# Patient Record
Sex: MALE | Race: ASIAN | NOT HISPANIC OR LATINO | Employment: FULL TIME | URBAN - METROPOLITAN AREA
[De-identification: names, ages, dates, MRNs, and addresses within clinical notes are randomized per-mention and may not be internally consistent; named-entity substitution may affect disease eponyms.]

---

## 2018-03-24 ENCOUNTER — OFFICE VISIT (OUTPATIENT)
Dept: URGENT CARE | Facility: CLINIC | Age: 48
End: 2018-03-24
Payer: COMMERCIAL

## 2018-03-24 VITALS
OXYGEN SATURATION: 98 % | RESPIRATION RATE: 16 BRPM | SYSTOLIC BLOOD PRESSURE: 115 MMHG | HEART RATE: 74 BPM | TEMPERATURE: 97.4 F | DIASTOLIC BLOOD PRESSURE: 66 MMHG

## 2018-03-24 DIAGNOSIS — J20.9 ACUTE BRONCHITIS, UNSPECIFIED ORGANISM: Primary | ICD-10-CM

## 2018-03-24 PROCEDURE — S9088 SERVICES PROVIDED IN URGENT: HCPCS | Performed by: PHYSICIAN ASSISTANT

## 2018-03-24 PROCEDURE — 99203 OFFICE O/P NEW LOW 30 MIN: CPT | Performed by: PHYSICIAN ASSISTANT

## 2018-03-24 RX ORDER — AZITHROMYCIN 250 MG/1
TABLET, FILM COATED ORAL
Qty: 6 TABLET | Refills: 0 | Status: SHIPPED | OUTPATIENT
Start: 2018-03-24 | End: 2018-03-28

## 2018-03-24 RX ORDER — BENZONATATE 100 MG/1
100 CAPSULE ORAL 3 TIMES DAILY PRN
Qty: 20 CAPSULE | Refills: 0 | Status: SHIPPED | OUTPATIENT
Start: 2018-03-24 | End: 2019-12-23

## 2018-03-24 NOTE — PATIENT INSTRUCTIONS

## 2019-08-19 ENCOUNTER — OFFICE VISIT (OUTPATIENT)
Dept: FAMILY MEDICINE CLINIC | Facility: CLINIC | Age: 49
End: 2019-08-19
Payer: COMMERCIAL

## 2019-08-19 ENCOUNTER — APPOINTMENT (OUTPATIENT)
Dept: LAB | Facility: CLINIC | Age: 49
End: 2019-08-19
Payer: COMMERCIAL

## 2019-08-19 VITALS
DIASTOLIC BLOOD PRESSURE: 74 MMHG | WEIGHT: 162.3 LBS | SYSTOLIC BLOOD PRESSURE: 120 MMHG | BODY MASS INDEX: 26.08 KG/M2 | OXYGEN SATURATION: 99 % | HEIGHT: 66 IN | HEART RATE: 69 BPM

## 2019-08-19 DIAGNOSIS — E11.9 TYPE 2 DIABETES MELLITUS WITHOUT COMPLICATION, WITHOUT LONG-TERM CURRENT USE OF INSULIN (HCC): Primary | ICD-10-CM

## 2019-08-19 DIAGNOSIS — E11.9 TYPE 2 DIABETES MELLITUS WITHOUT COMPLICATION, WITHOUT LONG-TERM CURRENT USE OF INSULIN (HCC): ICD-10-CM

## 2019-08-19 DIAGNOSIS — E78.00 PURE HYPERCHOLESTEROLEMIA: ICD-10-CM

## 2019-08-19 LAB
ANION GAP SERPL CALCULATED.3IONS-SCNC: 6 MMOL/L (ref 4–13)
BUN SERPL-MCNC: 18 MG/DL (ref 5–25)
CALCIUM SERPL-MCNC: 9.1 MG/DL (ref 8.3–10.1)
CHLORIDE SERPL-SCNC: 108 MMOL/L (ref 100–108)
CO2 SERPL-SCNC: 28 MMOL/L (ref 21–32)
CREAT SERPL-MCNC: 0.82 MG/DL (ref 0.6–1.3)
EST. AVERAGE GLUCOSE BLD GHB EST-MCNC: 143 MG/DL
GFR SERPL CREATININE-BSD FRML MDRD: 105 ML/MIN/1.73SQ M
GLUCOSE P FAST SERPL-MCNC: 133 MG/DL (ref 65–99)
HBA1C MFR BLD: 6.6 % (ref 4.2–6.3)
POTASSIUM SERPL-SCNC: 4.1 MMOL/L (ref 3.5–5.3)
SODIUM SERPL-SCNC: 142 MMOL/L (ref 136–145)

## 2019-08-19 PROCEDURE — 80048 BASIC METABOLIC PNL TOTAL CA: CPT

## 2019-08-19 PROCEDURE — 83036 HEMOGLOBIN GLYCOSYLATED A1C: CPT

## 2019-08-19 PROCEDURE — 99213 OFFICE O/P EST LOW 20 MIN: CPT | Performed by: FAMILY MEDICINE

## 2019-08-19 PROCEDURE — 3008F BODY MASS INDEX DOCD: CPT | Performed by: FAMILY MEDICINE

## 2019-08-19 PROCEDURE — 36415 COLL VENOUS BLD VENIPUNCTURE: CPT

## 2019-08-19 RX ORDER — ATORVASTATIN CALCIUM 10 MG/1
10 TABLET, FILM COATED ORAL DAILY
COMMUNITY
End: 2020-05-26 | Stop reason: SDUPTHER

## 2019-08-19 RX ORDER — OLOPATADINE HYDROCHLORIDE 1 MG/ML
SOLUTION/ DROPS OPHTHALMIC
Refills: 2 | COMMUNITY
Start: 2019-05-12 | End: 2021-04-13 | Stop reason: SDUPTHER

## 2019-08-19 NOTE — PROGRESS NOTES
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  Therefore, I do not think it is going to be feasible for him to exercise  However, I do want him to watch his diet  We discussed diabetic foot care at length  He will have fasting blood work today  I will make further recommendations to him at that time  He will return to the office in 4 months  Diagnoses and all orders for this visit:    Type 2 diabetes mellitus without complication, without long-term current use of insulin (HCC)  -     Basic metabolic panel; Future  -     Hemoglobin A1C; Future    Pure hypercholesterolemia    Other orders  -     olopatadine (PATANOL) 0 1 % ophthalmic solution; TAKE 1 DROPS OPHTHALMICALLY TO EACH EYE TWICE A DAY AS NEEDED FOR ITHCHING  -     atorvastatin (LIPITOR) 10 mg tablet; Take 10 mg by mouth daily  -     metFORMIN (GLUCOPHAGE) 1000 MG tablet; Take 1,000 mg by mouth 2 (two) times a day with meals          Subjective:      Patient ID: Kenny Magana is a 51 y o  male  This 41-year-old [facility] male presents to the office today with his wife for his routine checkup  Patient has a history of diabetes  He is scheduled for a dilated eye exam on Wednesday  He does check his feet every day  He does not check his own blood sugars  Wife reports he has not done well on his diet since his last visit  However, his weight has stayed the same        The following portions of the patient's history were reviewed and updated as appropriate: allergies, current medications, past family history, past medical history, past social history, past surgical history and problem list     Review of Systems   Cardiovascular: Negative  Endocrine: Negative for polydipsia, polyphagia and polyuria  Objective:      /74   Pulse 69   Ht 5' 6" (1 676 m)   Wt 73 6 kg (162 lb 4 8 oz)   SpO2 99%   BMI 26 20 kg/m²          Physical Exam   Constitutional: He appears well-developed and well-nourished  No distress  HENT:   Head: Normocephalic and atraumatic  Right Ear: External ear normal    Left Ear: External ear normal    Mouth/Throat: Oropharynx is clear and moist  No oropharyngeal exudate  Eyes: Pupils are equal, round, and reactive to light  Conjunctivae are normal  No scleral icterus  Neck: Neck supple  No tracheal deviation present  No thyromegaly present  Cardiovascular: Normal rate, regular rhythm and normal heart sounds  Exam reveals no gallop and no friction rub  Pulses are no weak pulses  No murmur heard  Pulses:       Dorsalis pedis pulses are 2+ on the right side, and 2+ on the left side  Posterior tibial pulses are 2+ on the right side, and 2+ on the left side  Pulmonary/Chest: Effort normal and breath sounds normal  No stridor  No respiratory distress  He has no wheezes  He has no rales  Abdominal: Soft  Bowel sounds are normal  He exhibits no distension and no mass  There is no tenderness  There is no rebound and no guarding  Feet:   Right Foot:   Skin Integrity: Negative for ulcer, skin breakdown, erythema, warmth, callus or dry skin  Left Foot:   Skin Integrity: Negative for ulcer, skin breakdown, erythema, warmth, callus or dry skin  Lymphadenopathy:     He has no cervical adenopathy  Skin: He is not diaphoretic  Vitals reviewed  Extremities:  Without cyanosis, clubbing, or edema  Patient's shoes and socks removed  Right Foot/Ankle   Right Foot Inspection  Skin Exam: skin normal and skin intact no dry skin, no warmth, no callus, no erythema, no maceration, no abnormal color, no pre-ulcer, no ulcer and no callus                          Toe Exam: no swelling, no tenderness, erythema and  no right toe deformity  Sensory   Vibration: intact  Proprioception: intact   Monofilament testing: intact  Vascular  Capillary refills: < 3 seconds  The right DP pulse is 2+  The right PT pulse is 2+  Left Foot/Ankle  Left Foot Inspection  Skin Exam: skin normal and skin intactno dry skin, no warmth, no erythema, no maceration, normal color, no pre-ulcer, no ulcer and no callus                         Toe Exam: no swelling, no tenderness, no erythema and no left toe deformity                   Sensory   Vibration: intact  Proprioception: intact  Monofilament: intact  Vascular  Capillary refills: < 3 seconds  The left DP pulse is 2+  The left PT pulse is 2+  Assign Risk Category:  No deformity present; No loss of protective sensation;  No weak pulses       Risk: 0

## 2019-08-19 NOTE — ASSESSMENT & PLAN NOTE
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

## 2019-08-19 NOTE — PATIENT INSTRUCTIONS
Foot Care for People with Diabetes   WHAT YOU NEED TO KNOW:   · Foot care helps protect your feet and prevent foot ulcers or sores  Long-term high blood sugar levels can damage the blood vessels and nerves in your legs and feet  This damage makes it hard to feel pressure, pain, temperature, and touch  You may not be able to feel a cut or sore, or shoes that are too tight  Foot care is needed to prevent serious problems, such as an infection or amputation  · Diabetes may cause your toes to become crooked or curved under  These changes may affect the way you walk and can lead to increased pressure on your foot  The pressure can decrease blood flow to your feet  Lack of blood flow increases your risk for a foot ulcer  Do not ignore small problems, such as dry skin or small wounds  These can become life-threatening over time without proper care  DISCHARGE INSTRUCTIONS:   Contact your healthcare provider if:   · Your feet become numb, weak, or hard to move  · You have pus draining from a sore on your foot  · You have a wound on your foot that gets bigger, deeper, or does not heal      · You see blisters, cuts, scratches, calluses, or sores on your foot  · You have a fever, and your feet become red, warm, and swollen  · Your toenails become thick, curled, or yellow  · You find it hard to check your feet because your vision is poor  · You have questions or concerns about your condition or care  Foot care:   · Check your feet each day  Look at your whole foot, including the bottom, and between and under your toes  Check for wounds, corns, and calluses  Use a mirror to see the bottom of your feet  The skin on your feet may be shiny, tight, or darker than normal  Your feet may also be cold and pale  Feel your feet by running your hands along the tops, bottoms, sides, and between your toes  Redness, swelling, and warmth are signs of blood flow problems that can lead to a foot ulcer   Do not try to remove corns or calluses yourself  · Wash your feet each day with soap and warm water  Do not use hot water, because this can injure your foot  Dry your feet gently with a towel after you wash them  Dry between and under your toes  · Apply lotion or a moisturizer on your dry feet  Ask your healthcare provider what lotions are best to use  Do not put lotion or moisturizer between your toes  · Cut your toenails correctly  File or cut your toenails straight across  Use a soft brush to clean around your toenails  If your toenails are very thick, you may need to have a healthcare provider or specialist cut them  · Protect your feet  Do not walk barefoot or wear your shoes without socks  Check your shoes for rocks or other objects that can hurt your feet  Wear cotton socks to help keep your feet dry  Wear socks without toe seams, or wear them with the seams inside out  Change your socks each day  Do not wear socks that are dirty or damp  · Wear shoes that fit well  Wear shoes that do not rub against any area of your feet  Your shoes should be ½ to ¾ inch (1 to 2 centimeters) longer than your feet  Your shoes should also have extra space around the widest part of your feet  Walking or athletic shoes with laces or straps that adjust are best  Ask your healthcare provider for help to choose shoes that fit you best  Ask him if you need to wear an insert, orthotic, or bandage on your feet  Follow up with your healthcare provider or foot specialist as directed: You will need to have your feet checked at least once a year  You may need a foot exam more often if you have nerve damage, foot deformities, or ulcers  Write down your questions so you remember to ask them during your visits  © 2017 [address] [employer] Information is for End User's use only and may not be sold, redistributed or otherwise used for commercial purposes   All illustrations and images included in CareNotes® are the copyrighted property of [employer]  or Troy Srinivasan  The above information is an  only  It is not intended as medical advice for individual conditions or treatments  Talk to your doctor, nurse or pharmacist before following any medical regimen to see if it is safe and effective for you

## 2019-08-31 LAB
LEFT EYE DIABETIC RETINOPATHY: NORMAL
RIGHT EYE DIABETIC RETINOPATHY: NORMAL

## 2019-09-04 DIAGNOSIS — E11.9 TYPE 2 DIABETES MELLITUS WITHOUT COMPLICATION, WITHOUT LONG-TERM CURRENT USE OF INSULIN (HCC): Primary | ICD-10-CM

## 2019-11-21 ENCOUNTER — IMMUNIZATIONS (OUTPATIENT)
Dept: FAMILY MEDICINE CLINIC | Facility: CLINIC | Age: 49
End: 2019-11-21
Payer: COMMERCIAL

## 2019-11-21 DIAGNOSIS — Z23 IMMUNIZATION DUE: Primary | ICD-10-CM

## 2019-11-21 PROCEDURE — 90686 IIV4 VACC NO PRSV 0.5 ML IM: CPT | Performed by: FAMILY MEDICINE

## 2019-11-21 PROCEDURE — 90471 IMMUNIZATION ADMIN: CPT | Performed by: FAMILY MEDICINE

## 2019-12-23 ENCOUNTER — APPOINTMENT (OUTPATIENT)
Dept: LAB | Facility: CLINIC | Age: 49
End: 2019-12-23
Payer: COMMERCIAL

## 2019-12-23 ENCOUNTER — OFFICE VISIT (OUTPATIENT)
Dept: FAMILY MEDICINE CLINIC | Facility: CLINIC | Age: 49
End: 2019-12-23
Payer: COMMERCIAL

## 2019-12-23 ENCOUNTER — APPOINTMENT (EMERGENCY)
Dept: CT IMAGING | Facility: HOSPITAL | Age: 49
End: 2019-12-23
Payer: COMMERCIAL

## 2019-12-23 ENCOUNTER — OFFICE VISIT (OUTPATIENT)
Dept: URGENT CARE | Facility: CLINIC | Age: 49
End: 2019-12-23
Payer: COMMERCIAL

## 2019-12-23 ENCOUNTER — HOSPITAL ENCOUNTER (EMERGENCY)
Facility: HOSPITAL | Age: 49
Discharge: HOME | End: 2019-12-24
Attending: EMERGENCY MEDICINE | Admitting: EMERGENCY MEDICINE
Payer: COMMERCIAL

## 2019-12-23 VITALS
RESPIRATION RATE: 18 BRPM | WEIGHT: 160 LBS | HEIGHT: 66 IN | OXYGEN SATURATION: 98 % | DIASTOLIC BLOOD PRESSURE: 69 MMHG | HEART RATE: 73 BPM | BODY MASS INDEX: 25.71 KG/M2 | TEMPERATURE: 98.4 F | SYSTOLIC BLOOD PRESSURE: 123 MMHG

## 2019-12-23 VITALS
WEIGHT: 159.7 LBS | DIASTOLIC BLOOD PRESSURE: 74 MMHG | OXYGEN SATURATION: 97 % | HEART RATE: 66 BPM | HEIGHT: 66 IN | SYSTOLIC BLOOD PRESSURE: 122 MMHG | BODY MASS INDEX: 25.67 KG/M2

## 2019-12-23 DIAGNOSIS — R10.31 RIGHT LOWER QUADRANT ABDOMINAL PAIN: Primary | ICD-10-CM

## 2019-12-23 DIAGNOSIS — R10.30 LOWER ABDOMINAL PAIN: Primary | ICD-10-CM

## 2019-12-23 DIAGNOSIS — E11.9 TYPE 2 DIABETES MELLITUS WITHOUT COMPLICATION, WITHOUT LONG-TERM CURRENT USE OF INSULIN (HCC): Primary | ICD-10-CM

## 2019-12-23 DIAGNOSIS — E78.00 PURE HYPERCHOLESTEROLEMIA: ICD-10-CM

## 2019-12-23 DIAGNOSIS — E11.9 TYPE 2 DIABETES MELLITUS WITHOUT COMPLICATION, WITHOUT LONG-TERM CURRENT USE OF INSULIN (HCC): ICD-10-CM

## 2019-12-23 LAB
ALBUMIN SERPL BCP-MCNC: 3.8 G/DL (ref 3.5–5)
ALBUMIN SERPL BCP-MCNC: 4.7 G/DL (ref 3.5–5.7)
ALP SERPL-CCNC: 64 U/L (ref 40–150)
ALP SERPL-CCNC: 76 U/L (ref 46–116)
ALT SERPL W P-5'-P-CCNC: 16 U/L (ref 7–52)
ALT SERPL W P-5'-P-CCNC: 23 U/L (ref 12–78)
ANION GAP SERPL CALCULATED.3IONS-SCNC: 3 MMOL/L (ref 4–13)
ANION GAP SERPL CALCULATED.3IONS-SCNC: 7 MMOL/L (ref 4–13)
AST SERPL W P-5'-P-CCNC: 10 U/L (ref 5–45)
AST SERPL W P-5'-P-CCNC: 15 U/L (ref 13–39)
BASOPHILS # BLD AUTO: 0.1 THOUSANDS/ΜL (ref 0–0.1)
BASOPHILS NFR BLD AUTO: 1 % (ref 0–2)
BILIRUB SERPL-MCNC: 0.9 MG/DL (ref 0.2–1)
BILIRUB SERPL-MCNC: 1.18 MG/DL (ref 0.2–1)
BUN SERPL-MCNC: 19 MG/DL (ref 7–25)
BUN SERPL-MCNC: 21 MG/DL (ref 5–25)
CALCIUM SERPL-MCNC: 9 MG/DL (ref 8.3–10.1)
CALCIUM SERPL-MCNC: 9.9 MG/DL (ref 8.6–10.5)
CHLORIDE SERPL-SCNC: 102 MMOL/L (ref 98–107)
CHLORIDE SERPL-SCNC: 109 MMOL/L (ref 100–108)
CHOLEST SERPL-MCNC: 142 MG/DL (ref 50–200)
CO2 SERPL-SCNC: 29 MMOL/L (ref 21–31)
CO2 SERPL-SCNC: 30 MMOL/L (ref 21–32)
CREAT SERPL-MCNC: 0.77 MG/DL (ref 0.7–1.3)
CREAT SERPL-MCNC: 0.9 MG/DL (ref 0.6–1.3)
CREAT UR-MCNC: 118 MG/DL
EOSINOPHIL # BLD AUTO: 0.1 THOUSAND/ΜL (ref 0–0.61)
EOSINOPHIL NFR BLD AUTO: 1 % (ref 0–5)
ERYTHROCYTE [DISTWIDTH] IN BLOOD BY AUTOMATED COUNT: 12.5 % (ref 11.5–14.5)
EST. AVERAGE GLUCOSE BLD GHB EST-MCNC: 143 MG/DL
GFR SERPL CREATININE-BSD FRML MDRD: 101 ML/MIN/1.73SQ M
GFR SERPL CREATININE-BSD FRML MDRD: 107 ML/MIN/1.73SQ M
GLUCOSE P FAST SERPL-MCNC: 130 MG/DL (ref 65–99)
GLUCOSE SERPL-MCNC: 197 MG/DL (ref 65–99)
HBA1C MFR BLD: 6.6 % (ref 4.2–6.3)
HCT VFR BLD AUTO: 46.5 % (ref 42–47)
HDLC SERPL-MCNC: 46 MG/DL
HGB BLD-MCNC: 15.5 G/DL (ref 14–18)
LDLC SERPL CALC-MCNC: 82 MG/DL (ref 0–100)
LIPASE SERPL-CCNC: 59 U/L (ref 11–82)
LYMPHOCYTES # BLD AUTO: 1.3 THOUSANDS/ΜL (ref 0.6–4.47)
LYMPHOCYTES NFR BLD AUTO: 19 % (ref 21–51)
MCH RBC QN AUTO: 30.6 PG (ref 26–34)
MCHC RBC AUTO-ENTMCNC: 33.3 G/DL (ref 31–37)
MCV RBC AUTO: 92 FL (ref 81–99)
MICROALBUMIN UR-MCNC: 16 MG/L (ref 0–20)
MICROALBUMIN/CREAT 24H UR: 14 MG/G CREATININE (ref 0–30)
MONOCYTES # BLD AUTO: 0.5 THOUSAND/ΜL (ref 0.17–1.22)
MONOCYTES NFR BLD AUTO: 8 % (ref 2–12)
NEUTROPHILS # BLD AUTO: 4.8 THOUSANDS/ΜL (ref 1.4–6.5)
NEUTS SEG NFR BLD AUTO: 71 % (ref 42–75)
NONHDLC SERPL-MCNC: 96 MG/DL
PLATELET # BLD AUTO: 148 THOUSANDS/UL (ref 149–390)
PMV BLD AUTO: 9.9 FL (ref 8.6–11.7)
POTASSIUM SERPL-SCNC: 3.8 MMOL/L (ref 3.5–5.5)
POTASSIUM SERPL-SCNC: 4.1 MMOL/L (ref 3.5–5.3)
PROT SERPL-MCNC: 7.3 G/DL (ref 6.4–8.2)
PROT SERPL-MCNC: 7.9 G/DL (ref 6.4–8.9)
RBC # BLD AUTO: 5.06 MILLION/UL (ref 4.3–5.9)
SL AMB  POCT GLUCOSE, UA: NEGATIVE
SL AMB LEUKOCYTE ESTERASE,UA: NEGATIVE
SL AMB POCT BILIRUBIN,UA: NEGATIVE
SL AMB POCT BLOOD,UA: NEGATIVE
SL AMB POCT CLARITY,UA: CLEAR
SL AMB POCT COLOR,UA: YELLOW
SL AMB POCT KETONES,UA: NEGATIVE
SL AMB POCT NITRITE,UA: NEGATIVE
SL AMB POCT PH,UA: 6.5
SL AMB POCT SPECIFIC GRAVITY,UA: 1.01
SL AMB POCT URINE PROTEIN: NEGATIVE
SL AMB POCT UROBILINOGEN: 0.2
SODIUM SERPL-SCNC: 138 MMOL/L (ref 134–143)
SODIUM SERPL-SCNC: 142 MMOL/L (ref 136–145)
TRIGL SERPL-MCNC: 69 MG/DL
WBC # BLD AUTO: 6.7 THOUSAND/UL (ref 4.8–10.8)

## 2019-12-23 PROCEDURE — 80053 COMPREHEN METABOLIC PANEL: CPT

## 2019-12-23 PROCEDURE — 99284 EMERGENCY DEPT VISIT MOD MDM: CPT | Performed by: EMERGENCY MEDICINE

## 2019-12-23 PROCEDURE — 80053 COMPREHEN METABOLIC PANEL: CPT | Performed by: EMERGENCY MEDICINE

## 2019-12-23 PROCEDURE — 99284 EMERGENCY DEPT VISIT MOD MDM: CPT

## 2019-12-23 PROCEDURE — 99213 OFFICE O/P EST LOW 20 MIN: CPT | Performed by: NURSE PRACTITIONER

## 2019-12-23 PROCEDURE — S9088 SERVICES PROVIDED IN URGENT: HCPCS | Performed by: NURSE PRACTITIONER

## 2019-12-23 PROCEDURE — 3061F NEG MICROALBUMINURIA REV: CPT | Performed by: FAMILY MEDICINE

## 2019-12-23 PROCEDURE — 96374 THER/PROPH/DIAG INJ IV PUSH: CPT

## 2019-12-23 PROCEDURE — 83036 HEMOGLOBIN GLYCOSYLATED A1C: CPT

## 2019-12-23 PROCEDURE — 80061 LIPID PANEL: CPT

## 2019-12-23 PROCEDURE — 85025 COMPLETE CBC W/AUTO DIFF WBC: CPT | Performed by: EMERGENCY MEDICINE

## 2019-12-23 PROCEDURE — 74177 CT ABD & PELVIS W/CONTRAST: CPT

## 2019-12-23 PROCEDURE — 96361 HYDRATE IV INFUSION ADD-ON: CPT

## 2019-12-23 PROCEDURE — 82043 UR ALBUMIN QUANTITATIVE: CPT | Performed by: FAMILY MEDICINE

## 2019-12-23 PROCEDURE — 83690 ASSAY OF LIPASE: CPT | Performed by: EMERGENCY MEDICINE

## 2019-12-23 PROCEDURE — 99213 OFFICE O/P EST LOW 20 MIN: CPT | Performed by: FAMILY MEDICINE

## 2019-12-23 PROCEDURE — 81002 URINALYSIS NONAUTO W/O SCOPE: CPT | Performed by: NURSE PRACTITIONER

## 2019-12-23 PROCEDURE — 82570 ASSAY OF URINE CREATININE: CPT | Performed by: FAMILY MEDICINE

## 2019-12-23 PROCEDURE — 36415 COLL VENOUS BLD VENIPUNCTURE: CPT

## 2019-12-23 RX ORDER — SODIUM CHLORIDE 9 MG/ML
125 INJECTION, SOLUTION INTRAVENOUS CONTINUOUS
Status: DISCONTINUED | OUTPATIENT
Start: 2019-12-23 | End: 2019-12-24 | Stop reason: HOSPADM

## 2019-12-23 RX ORDER — KETOROLAC TROMETHAMINE 30 MG/ML
30 INJECTION, SOLUTION INTRAMUSCULAR; INTRAVENOUS ONCE
Status: COMPLETED | OUTPATIENT
Start: 2019-12-23 | End: 2019-12-23

## 2019-12-23 RX ADMIN — SODIUM CHLORIDE 125 ML/HR: 0.9 INJECTION, SOLUTION INTRAVENOUS at 21:45

## 2019-12-23 RX ADMIN — KETOROLAC TROMETHAMINE 30 MG: 30 INJECTION, SOLUTION INTRAMUSCULAR; INTRAVENOUS at 21:45

## 2019-12-23 RX ADMIN — IOHEXOL 100 ML: 350 INJECTION, SOLUTION INTRAVENOUS at 21:12

## 2019-12-23 NOTE — PATIENT INSTRUCTIONS
Foot Care for People with Diabetes   WHAT YOU NEED TO KNOW:   · Foot care helps protect your feet and prevent foot ulcers or sores  Long-term high blood sugar levels can damage the blood vessels and nerves in your legs and feet  This damage makes it hard to feel pressure, pain, temperature, and touch  You may not be able to feel a cut or sore, or shoes that are too tight  Foot care is needed to prevent serious problems, such as an infection or amputation  · Diabetes may cause your toes to become crooked or curved under  These changes may affect the way you walk and can lead to increased pressure on your foot  The pressure can decrease blood flow to your feet  Lack of blood flow increases your risk for a foot ulcer  Do not ignore small problems, such as dry skin or small wounds  These can become life-threatening over time without proper care  DISCHARGE INSTRUCTIONS:   Contact your healthcare provider if:   · Your feet become numb, weak, or hard to move  · You have pus draining from a sore on your foot  · You have a wound on your foot that gets bigger, deeper, or does not heal      · You see blisters, cuts, scratches, calluses, or sores on your foot  · You have a fever, and your feet become red, warm, and swollen  · Your toenails become thick, curled, or yellow  · You find it hard to check your feet because your vision is poor  · You have questions or concerns about your condition or care  Foot care:   · Check your feet each day  Look at your whole foot, including the bottom, and between and under your toes  Check for wounds, corns, and calluses  Use a mirror to see the bottom of your feet  The skin on your feet may be shiny, tight, or darker than normal  Your feet may also be cold and pale  Feel your feet by running your hands along the tops, bottoms, sides, and between your toes  Redness, swelling, and warmth are signs of blood flow problems that can lead to a foot ulcer   Do not try to remove corns or calluses yourself  · Wash your feet each day with soap and warm water  Do not use hot water, because this can injure your foot  Dry your feet gently with a towel after you wash them  Dry between and under your toes  · Apply lotion or a moisturizer on your dry feet  Ask your healthcare provider what lotions are best to use  Do not put lotion or moisturizer between your toes  · Cut your toenails correctly  File or cut your toenails straight across  Use a soft brush to clean around your toenails  If your toenails are very thick, you may need to have a healthcare provider or specialist cut them  · Protect your feet  Do not walk barefoot or wear your shoes without socks  Check your shoes for rocks or other objects that can hurt your feet  Wear cotton socks to help keep your feet dry  Wear socks without toe seams, or wear them with the seams inside out  Change your socks each day  Do not wear socks that are dirty or damp  · Wear shoes that fit well  Wear shoes that do not rub against any area of your feet  Your shoes should be ½ to ¾ inch (1 to 2 centimeters) longer than your feet  Your shoes should also have extra space around the widest part of your feet  Walking or athletic shoes with laces or straps that adjust are best  Ask your healthcare provider for help to choose shoes that fit you best  Ask him if you need to wear an insert, orthotic, or bandage on your feet  Follow up with your healthcare provider or foot specialist as directed: You will need to have your feet checked at least once a year  You may need a foot exam more often if you have nerve damage, foot deformities, or ulcers  Write down your questions so you remember to ask them during your visits  © 2017 [address] [employer] Information is for End User's use only and may not be sold, redistributed or otherwise used for commercial purposes   All illustrations and images included in CareNotes® are the copyrighted property of [employer]  or Troy Srinivasan  The above information is an  only  It is not intended as medical advice for individual conditions or treatments  Talk to your doctor, nurse or pharmacist before following any medical regimen to see if it is safe and effective for you

## 2019-12-23 NOTE — PROGRESS NOTES
Assessment/Plan:    Type 2 diabetes mellitus, without long-term current use of insulin (HCC)    Lab Results   Component Value Date    HGBA1C 6 6 (H) 12/23/2019    Patient has type 2 diabetes mellitus  Fasting blood work was ordered today  He will continue metformin  His goal hemoglobin A1c is less than 7 0%  Patient just had a dilated eye exam   We will see if we can get these results  I discussed diabetic foot care  His daughter translated  Pure hypercholesterolemia  A fasting lipid panel has been ordered  Patient will continue atorvastatin  His goal LDL cholesterol is less than 70  Diagnoses and all orders for this visit:    Type 2 diabetes mellitus without complication, without long-term current use of insulin (HCC)  -     Microalbumin / creatinine urine ratio  -     Comprehensive metabolic panel; Future  -     Hemoglobin A1C; Future    Pure hypercholesterolemia  -     Lipid panel; Future          Subjective:      Patient ID: Queen Rosibel is a 51 y o  male  Patient is a 56-year-old [place] male who presents to the office today with his daughter  Patient is here today for his routine checkup  Patient has been doing well without complaints  He works long hours  He does not exercise  Daughter reports he does not check his own blood sugars  He tries to watch his diet  He did fast today to have blood work done  The following portions of the patient's history were reviewed and updated as appropriate: allergies, current medications, past family history, past medical history, past social history, past surgical history and problem list     Review of Systems   Respiratory: Negative for cough, shortness of breath and wheezing  Cardiovascular: Negative for chest pain, palpitations and leg swelling  Gastrointestinal: Negative for abdominal distention, abdominal pain, blood in stool, constipation, diarrhea, nausea and vomiting           Objective:      /74 (BP Location: Left arm, Patient Position: Sitting, Cuff Size: Adult)   Pulse 66   Ht 5' 6" (1 676 m)   Wt 72 4 kg (159 lb 11 2 oz)   SpO2 97%   BMI 25 78 kg/m²          Physical Exam   Constitutional: He appears well-developed and well-nourished  No distress  HENT:   Head: Normocephalic and atraumatic  Right Ear: External ear normal    Left Ear: External ear normal    Mouth/Throat: Oropharynx is clear and moist  No oropharyngeal exudate  Tympanic membranes are clear   Eyes: Pupils are equal, round, and reactive to light  Conjunctivae are normal  No scleral icterus  Neck: Neck supple  No tracheal deviation present  No thyromegaly present  There were no carotid bruits noted   Cardiovascular: Normal rate, regular rhythm and normal heart sounds  Exam reveals no gallop and no friction rub  No murmur heard  Pulmonary/Chest: Effort normal and breath sounds normal  No stridor  No respiratory distress  He has no wheezes  He has no rales  Abdominal: Soft  Bowel sounds are normal  He exhibits no distension and no mass  There is no tenderness  There is no rebound and no guarding  Lymphadenopathy:     He has no cervical adenopathy  Psychiatric: He has a normal mood and affect  His behavior is normal  Judgment and thought content normal    Vitals reviewed      extremities:  Without cyanosis, clubbing, or edema

## 2019-12-24 VITALS
HEART RATE: 61 BPM | RESPIRATION RATE: 18 BRPM | BODY MASS INDEX: 25.71 KG/M2 | SYSTOLIC BLOOD PRESSURE: 114 MMHG | DIASTOLIC BLOOD PRESSURE: 73 MMHG | WEIGHT: 160 LBS | HEIGHT: 66 IN | TEMPERATURE: 98.4 F | OXYGEN SATURATION: 98 %

## 2019-12-24 NOTE — ED PROVIDER NOTES
History  Chief Complaint   Patient presents with    Abdominal Pain     lower abdominal pain that began yesterday  denies N,V,D     PT HERE W/ DAUGHTER, WHO IS TRANSLATING AT PT'S REQUEST:    50-YEAR-OLD MALE  PAST MEDICAL HISTORY: NONE    PATIENT IS HERE FOR RIGHT LOWER QUADRANT ABDOMINAL PAIN    STATES THIS STARTED THIS MORNING, CAME ON GRADUALLY   IT HAS BEEN COMING AND GOING, BUT IT HAS BEEN CONSTANT FOR THE LAST COUPLE OF HOURS  IT IS NOW  RATED 5/10     ASSOCIATED SYMPTOMS:    URINARY  SYMPTOMS:   THERE IS NO DYSURIA, NO HEMATURIA, NO FREQUENCY  HE DENIES NAUSEA, VOMITING  DENIES FEVERS, CHILLS  DENIES LOOSE STOOLS OR DIARRHEA, NO BLOODY STOOLS - NOT BLACK OR BLOODY    ALLEVIATING OR EXACERBATING FACTORS:  UNCERTAIN    INTERVENTIONS: NONE        History provided by:  Patient  Abdominal Pain   Pain location:  RLQ  Pain quality: cramping    Pain radiates to:  Does not radiate  Pain severity:  Moderate  Onset quality:  Gradual  Timing:  Intermittent  Progression:  Worsening  Chronicity:  New  Relieved by:  Nothing  Worsened by:  Nothing  Ineffective treatments:  None tried  Associated symptoms: no chest pain, no chills, no constipation, no cough, no diarrhea, no dysuria, no fatigue, no fever, no hematochezia, no hematuria, no nausea, no shortness of breath and no vomiting        Prior to Admission Medications   Prescriptions Last Dose Informant Patient Reported? Taking?   atorvastatin (LIPITOR) 10 mg tablet   Yes No   Sig: Take 10 mg by mouth daily   metFORMIN (GLUCOPHAGE) 1000 MG tablet   No No   Sig: Take 1 tablet (1,000 mg total) by mouth 2 (two) times a day with meals   olopatadine (PATANOL) 0 1 % ophthalmic solution   Yes No   Sig: TAKE 1 DROPS OPHTHALMICALLY TO EACH EYE TWICE A DAY AS NEEDED FOR ITHCHING      Facility-Administered Medications: None       Past Medical History:   Diagnosis Date    Diabetes mellitus ([facility] [address] )     Hyperlipidemia     last assessed: 12/17/2018       History reviewed   No pertinent surgical history  Family History   Problem Relation Age of Onset    No Known Problems Mother     Liver cancer Father      I have reviewed and agree with the history as documented  Social History     Tobacco Use    Smoking status: Never Smoker    Smokeless tobacco: Never Used   Substance Use Topics    Alcohol use: Yes     Frequency: Monthly or less     Comment: occasional glass of wine    Drug use: Never        Review of Systems   Constitutional: Negative for chills, diaphoresis, fatigue and fever  Respiratory: Negative for cough, shortness of breath, wheezing and stridor  Cardiovascular: Negative for chest pain, palpitations and leg swelling  Gastrointestinal: Positive for abdominal pain  Negative for blood in stool, constipation, diarrhea, hematochezia, nausea and vomiting  Genitourinary: Negative for difficulty urinating, dysuria, flank pain, frequency and hematuria  Musculoskeletal: Negative for arthralgias, back pain, gait problem, joint swelling, myalgias, neck pain and neck stiffness  Skin: Negative for rash and wound  Neurological: Negative for dizziness, light-headedness and headaches  All other systems reviewed and are negative  Physical Exam  Physical Exam   Constitutional: He is oriented to person, place, and time  He appears well-developed and well-nourished  Non-toxic appearance  He does not appear ill  No distress  HENT:   Head: Normocephalic and atraumatic  Nose: Nose normal    Mouth/Throat: Oropharynx is clear and moist  No oropharyngeal exudate  Eyes: Pupils are equal, round, and reactive to light  Conjunctivae and EOM are normal  Right eye exhibits no discharge  Left eye exhibits no discharge  No scleral icterus  Neck: Normal range of motion  Neck supple  No JVD present  No tracheal deviation present  Cardiovascular: Normal rate, regular rhythm, normal heart sounds and intact distal pulses  Exam reveals no gallop and no friction rub     No murmur heard  Pulmonary/Chest: Effort normal and breath sounds normal  No stridor  No respiratory distress  He has no wheezes  He has no rhonchi  He has no rales  He exhibits no tenderness  Abdominal: Soft  Bowel sounds are normal  He exhibits no shifting dullness, no distension, no pulsatile liver, no fluid wave, no abdominal bruit, no ascites, no pulsatile midline mass and no mass  There is no hepatosplenomegaly, splenomegaly or hepatomegaly  There is tenderness in the right lower quadrant  There is no rigidity, no rebound, no guarding, no CVA tenderness, no tenderness at McBurney's point and negative Macdonald's sign  No hernia  Musculoskeletal: Normal range of motion  He exhibits no edema, tenderness or deformity  Lymphadenopathy:     He has no cervical adenopathy  Neurological: He is alert and oriented to person, place, and time  No cranial nerve deficit or sensory deficit  He exhibits normal muscle tone  Coordination normal    Skin: Skin is warm  Capillary refill takes less than 2 seconds  No rash noted  He is not diaphoretic  No erythema  No pallor  Psychiatric: He has a normal mood and affect   His behavior is normal  Judgment and thought content normal        Vital Signs  ED Triage Vitals [12/23/19 1948]   Temperature Pulse Respirations Blood Pressure SpO2   98 4 °F (36 9 °C) 72 16 105/62 97 %      Temp Source Heart Rate Source Patient Position - Orthostatic VS BP Location FiO2 (%)   Temporal Monitor Sitting Left arm --      Pain Score       5           Vitals:    12/23/19 1948 12/24/19 0008   BP: 105/62 114/73   Pulse: 72 61   Patient Position - Orthostatic VS: Sitting          Visual Acuity      ED Medications  Medications   ketorolac (TORADOL) injection 30 mg (30 mg Intravenous Given 12/23/19 2145)   iohexol (OMNIPAQUE) 350 MG/ML injection (SINGLE-DOSE) 100 mL (100 mL Intravenous Given 12/23/19 2112)       Diagnostic Studies  Results Reviewed     Procedure Component Value Units Date/Time    Lipase [[id number]]  (Normal) Collected:  12/23/19 2031    Lab Status:  Final result Specimen:  Blood from Arm, Left Updated:  12/23/19 2101     Lipase 59 u/L     CMP [[id number]]  (Abnormal) Collected:  12/23/19 2031    Lab Status:  Final result Specimen:  Blood from Arm, Left Updated:  12/23/19 2101     Sodium 138 mmol/L      Potassium 3 8 mmol/L      Chloride 102 mmol/L      CO2 29 mmol/L      ANION GAP 7 mmol/L      BUN 19 mg/dL      Creatinine 0 77 mg/dL      Glucose 197 mg/dL      Calcium 9 9 mg/dL      AST 15 U/L      ALT 16 U/L      Alkaline Phosphatase 64 U/L      Total Protein 7 9 g/dL      Albumin 4 7 g/dL      Total Bilirubin 0 90 mg/dL      eGFR 107 ml/min/1 73sq m     Narrative:       [place] guidelines for Chronic Kidney Disease (CKD):     Stage 1 with normal or high GFR (GFR > 90 mL/min/1 73 square meters)    Stage 2 Mild CKD (GFR = 60-89 mL/min/1 73 square meters)    Stage 3A Moderate CKD (GFR = 45-59 mL/min/1 73 square meters)    Stage 3B Moderate CKD (GFR = 30-44 mL/min/1 73 square meters)    Stage 4 Severe CKD (GFR = 15-29 mL/min/1 73 square meters)    Stage 5 End Stage CKD (GFR <15 mL/min/1 73 square meters)  Note: GFR calculation is accurate only with a steady state creatinine    CBC and differential [[id number]]  (Abnormal) Collected:  12/23/19 2031    Lab Status:  Final result Specimen:  Blood from Arm, Left Updated:  12/23/19 2036     WBC 6 70 Thousand/uL      RBC 5 06 Million/uL      Hemoglobin 15 5 g/dL      Hematocrit 46 5 %      MCV 92 fL      MCH 30 6 pg      MCHC 33 3 g/dL      RDW 12 5 %      MPV 9 9 fL      Platelets 513 Thousands/uL      Neutrophils Relative 71 %      Lymphocytes Relative 19 %      Monocytes Relative 8 %      Eosinophils Relative 1 %      Basophils Relative 1 %      Neutrophils Absolute 4 80 Thousands/µL      Lymphocytes Absolute 1 30 Thousands/µL      Monocytes Absolute 0 50 Thousand/µL      Eosinophils Absolute 0 10 Thousand/µL Basophils Absolute 0 10 Thousands/µL                  CT Abdomen pelvis with contrast   Final Result by Leandro Mallory MD (12/23 2252)      Suspect small bowel enteritis            Workstation performed: [id number]                    Procedures  Procedures         ED Course  ED Course as of Dec 24 0340   Mon Dec 23, 2019   2306 CT ABDOMEN AND PELVIS WITH IV CONTRAST     INDICATION:   Abdominal pain, acute, nonlocalized      COMPARISON:  None      TECHNIQUE:  CT examination of the abdomen and pelvis was performed  Axial, sagittal, and coronal 2D reformatted images were created from the source data and submitted for interpretation      Radiation dose length product (DLP) for this visit:  340 1 mGy-cm   This examination, like all CT scans performed in the [facility], was performed utilizing techniques to minimize radiation dose exposure, including the use of iterative   reconstruction and automated exposure control      IV Contrast:  100 mL of iohexol (OMNIPAQUE)  Enteric Contrast:  Enteric contrast was not administered      FINDINGS:     ABDOMEN     LOWER CHEST:  No clinically significant abnormality identified in the visualized lower chest      LIVER/BILIARY TREE:  Unremarkable      GALLBLADDER:  No calcified gallstones  No pericholecystic inflammatory change      SPLEEN:  Unremarkable      PANCREAS:  Unremarkable      ADRENAL GLANDS:  Unremarkable      KIDNEYS/URETERS:  Focal low-attenuation right upper renal pole best seen on series 4 image 82 could relate to cyst rather than focal pyelonephritis, correlate with urinalysis      STOMACH AND BOWEL:  Fecalization of small bowel loops containing prominent wall suggesting small bowel enteritis      APPENDIX:  No findings to suggest appendicitis      ABDOMINOPELVIC CAVITY:  No ascites or free intraperitoneal air   No lymphadenopathy      VESSELS:  Unremarkable for patient's age      PELVIS     REPRODUCTIVE ORGANS:  Unremarkable for patient's age      URINARY BLADDER:  Unremarkable      ABDOMINAL WALL/INGUINAL REGIONS:  Unremarkable      OSSEOUS STRUCTURES:  No acute fracture or destructive osseous lesion      IMPRESSION:     Suspect small bowel enteritis         2306 Lipase: 59   2306 WBC: 6 70   2306 Hemoglobin: 15 5   2306 Platelet Count(!): 870   2306 Neutrophils %: 71   2306 Lymphocytes Relative(!): 19   2306 Monocytes Relative: 8   2306 Sodium: 138   2306 Potassium: 3 8   2306 Chloride: 102   2306 CO2: 29   2306 Anion Gap: 7   2306 BUN: 19   2306 Creatinine: 0 77   2306 AST: 15   2306 ALT: 16   2306 Labs all unremarkable, correlate with patient's vital signs and bedside exam    CT scan is suspicious for small-bowel enteritis    Workup certainly indicates patient is safe for disposition home    Alkaline Phosphatase: 64   2336 PATIENT UNDERSTANDS THE RESULTS OF WORKUP  NO INDICATIONS FOR ABX  CERTAINLY NO INDICATIONS FOR ADMISSION - PAIN COMPLETELY RESOLVED W/ TORADOL, EXAM STABLE AND NON CONCERNING    HE IS VERY APPRECIATIVE OF HIS ER CARE AND HE IS READY TO MANAGE FROM HOME  WILL RETURN IF WORSE  WILL F/U W/ GI                                    MDM      Disposition  Final diagnoses:   Right lower quadrant abdominal pain     Time reflects when diagnosis was documented in both MDM as applicable and the Disposition within this note     Time User Action Codes Description Comment    12/23/2019 11:46 PM Deanna Mercedes Add [R10 31] Right lower quadrant abdominal pain       ED Disposition     ED Disposition Condition Date/Time Comment    Discharge Stable Mon Dec 23, 2019 11:46 PM Felecia Redding discharge to home/self care              Follow-up Information     Follow up With Specialties Details Why DO [facility] Call today  [address] [place] [address] 1  [facility] [address]  [phone]      [facility] Gastroenterology Call today  [address]   [facility]  [phone]            Discharge Medication List as of 12/23/2019 11:49 PM      CONTINUE these medications which have NOT CHANGED    Details   atorvastatin (LIPITOR) 10 mg tablet Take 10 mg by mouth daily, Historical Med      metFORMIN (GLUCOPHAGE) 1000 MG tablet Take 1 tablet (1,000 mg total) by mouth 2 (two) times a day with meals, Starting Wed 9/4/2019, Normal      olopatadine (PATANOL) 0 1 % ophthalmic solution TAKE 1 DROPS OPHTHALMICALLY TO EACH EYE TWICE A DAY AS NEEDED FOR ITHCHING, Historical Med           No discharge procedures on file      ED Provider  Electronically Signed by           Hardik Truong MD  12/24/19 12 [facility] Margaret Fraga MD  12/24/19 3004

## 2019-12-24 NOTE — DISCHARGE INSTRUCTIONS
RETURN IF WORSE IN ANY WAY:   INCREASED PAIN, FEVER OR FLU LIKE SYMPTOMS, OR NEW AND CONCERNING SYMPTOMS SIGNS OR SYMPTOMS:    PLEASE CALL YOUR PRIMARY DOCTOR IN THE MORNING TO SET UP FOLLOW UP     PLEASE REVIEW THE WORK UP RESULTS WITH YOUR DOCTOR

## 2019-12-24 NOTE — ASSESSMENT & PLAN NOTE
A fasting lipid panel has been ordered  Patient will continue atorvastatin  His goal LDL cholesterol is less than 70

## 2019-12-24 NOTE — PROGRESS NOTES
[facility]        NAME: Shaun Alan is a 51 y o  male  : 1970    MRN: [id number]  DATE: 2019  TIME: 7:26 PM    Assessment and Plan   Lower abdominal pain [R10 30]  1  Lower abdominal pain  Transfer to other facility    POCT urine dip         Patient Instructions       Proceed to emergency room for further evaluation and treatment  Chief Complaint     Chief Complaint   Patient presents with    Abdominal Pain     right         History of Present Illness       Abdominal Pain   This is a new problem  The current episode started yesterday (Started yesterday in right lower quadrant  Today across lower abdomen  )  The pain is located in the LLQ and RLQ  Pain scale: About 5/10  Can increase to 8/10 at times  The abdominal pain does not radiate  Pertinent negatives include no constipation, diarrhea, dysuria, fever, flatus, frequency, hematochezia, hematuria, melena, nausea or vomiting  Nothing aggravates the pain  The pain is relieved by nothing  Review of Systems   Review of Systems   Constitutional: Negative for fever  Gastrointestinal: Positive for abdominal pain  Negative for abdominal distention, blood in stool, constipation, diarrhea, flatus, hematochezia, melena, nausea, rectal pain and vomiting  Genitourinary: Negative for dysuria, flank pain, frequency and hematuria           Current Medications       Current Outpatient Medications:     atorvastatin (LIPITOR) 10 mg tablet, Take 10 mg by mouth daily, Disp: , Rfl:     metFORMIN (GLUCOPHAGE) 1000 MG tablet, Take 1 tablet (1,000 mg total) by mouth 2 (two) times a day with meals, Disp: 180 tablet, Rfl: 3    benzonatate (TESSALON PERLES) 100 mg capsule, Take 1 capsule (100 mg total) by mouth 3 (three) times a day as needed for cough (Patient not taking: Reported on 2019), Disp: 20 capsule, Rfl: 0    olopatadine (PATANOL) 0 1 % ophthalmic solution, TAKE 1 DROPS OPHTHALMICALLY TO EACH EYE TWICE A DAY AS NEEDED FOR CHOLO, Disp: , Rfl: 2    Current Allergies     Allergies as of 12/23/2019    (No Known Allergies)            The following portions of the patient's history were reviewed and updated as appropriate: allergies, current medications, past family history, past medical history, past social history, past surgical history and problem list      Past Medical History:   Diagnosis Date    Diabetes mellitus ([facility] )     Hyperlipidemia     last assessed: 12/17/2018       History reviewed  No pertinent surgical history  Family History   Problem Relation Age of Onset    No Known Problems Mother     Liver cancer Father          Medications have been verified          Objective   /69   Pulse 73   Temp 98 4 °F (36 9 °C)   Resp 18   Ht 5' 6" (1 676 m)   Wt 72 6 kg (160 lb)   SpO2 98%   BMI 25 82 kg/m²        Physical Exam     Physical Exam

## 2019-12-24 NOTE — ASSESSMENT & PLAN NOTE
Lab Results   Component Value Date    HGBA1C 6 6 (H) 12/23/2019    Patient has type 2 diabetes mellitus  Fasting blood work was ordered today  He will continue metformin  His goal hemoglobin A1c is less than 7 0%  Patient just had a dilated eye exam   We will see if we can get these results  I discussed diabetic foot care  His daughter translated

## 2019-12-30 ENCOUNTER — OFFICE VISIT (OUTPATIENT)
Dept: FAMILY MEDICINE CLINIC | Facility: CLINIC | Age: 49
End: 2019-12-30
Payer: COMMERCIAL

## 2019-12-30 VITALS
BODY MASS INDEX: 25.73 KG/M2 | HEIGHT: 66 IN | OXYGEN SATURATION: 100 % | TEMPERATURE: 98.4 F | DIASTOLIC BLOOD PRESSURE: 70 MMHG | HEART RATE: 65 BPM | WEIGHT: 160.1 LBS | SYSTOLIC BLOOD PRESSURE: 118 MMHG

## 2019-12-30 DIAGNOSIS — R10.84 GENERALIZED ABDOMINAL PAIN: Primary | ICD-10-CM

## 2019-12-30 PROCEDURE — 99213 OFFICE O/P EST LOW 20 MIN: CPT | Performed by: FAMILY MEDICINE

## 2019-12-30 PROCEDURE — 1036F TOBACCO NON-USER: CPT | Performed by: FAMILY MEDICINE

## 2019-12-30 PROCEDURE — 2022F DILAT RTA XM EVC RTNOPTHY: CPT | Performed by: FAMILY MEDICINE

## 2020-04-01 PROBLEM — R10.84 GENERALIZED ABDOMINAL PAIN: Status: ACTIVE | Noted: 2020-04-01

## 2020-05-26 ENCOUNTER — APPOINTMENT (OUTPATIENT)
Dept: LAB | Facility: CLINIC | Age: 50
End: 2020-05-26
Payer: COMMERCIAL

## 2020-05-26 ENCOUNTER — OFFICE VISIT (OUTPATIENT)
Dept: FAMILY MEDICINE CLINIC | Facility: CLINIC | Age: 50
End: 2020-05-26
Payer: COMMERCIAL

## 2020-05-26 VITALS
BODY MASS INDEX: 23.84 KG/M2 | HEART RATE: 67 BPM | OXYGEN SATURATION: 97 % | WEIGHT: 157.3 LBS | DIASTOLIC BLOOD PRESSURE: 72 MMHG | SYSTOLIC BLOOD PRESSURE: 120 MMHG | TEMPERATURE: 98.1 F | HEIGHT: 68 IN

## 2020-05-26 DIAGNOSIS — E11.9 TYPE 2 DIABETES MELLITUS WITHOUT COMPLICATION, WITHOUT LONG-TERM CURRENT USE OF INSULIN (HCC): ICD-10-CM

## 2020-05-26 DIAGNOSIS — E78.00 PURE HYPERCHOLESTEROLEMIA: Primary | ICD-10-CM

## 2020-05-26 DIAGNOSIS — E11.9 TYPE 2 DIABETES MELLITUS WITHOUT COMPLICATION, WITHOUT LONG-TERM CURRENT USE OF INSULIN (HCC): Primary | ICD-10-CM

## 2020-05-26 LAB
ANION GAP SERPL CALCULATED.3IONS-SCNC: 4 MMOL/L (ref 4–13)
BUN SERPL-MCNC: 17 MG/DL (ref 5–25)
CALCIUM SERPL-MCNC: 9.4 MG/DL (ref 8.3–10.1)
CHLORIDE SERPL-SCNC: 108 MMOL/L (ref 100–108)
CO2 SERPL-SCNC: 26 MMOL/L (ref 21–32)
CREAT SERPL-MCNC: 0.72 MG/DL (ref 0.6–1.3)
EST. AVERAGE GLUCOSE BLD GHB EST-MCNC: 137 MG/DL
GFR SERPL CREATININE-BSD FRML MDRD: 110 ML/MIN/1.73SQ M
GLUCOSE P FAST SERPL-MCNC: 122 MG/DL (ref 65–99)
HBA1C MFR BLD: 6.4 %
POTASSIUM SERPL-SCNC: 4.1 MMOL/L (ref 3.5–5.3)
SODIUM SERPL-SCNC: 138 MMOL/L (ref 136–145)

## 2020-05-26 PROCEDURE — 1036F TOBACCO NON-USER: CPT | Performed by: FAMILY MEDICINE

## 2020-05-26 PROCEDURE — 36415 COLL VENOUS BLD VENIPUNCTURE: CPT

## 2020-05-26 PROCEDURE — 83036 HEMOGLOBIN GLYCOSYLATED A1C: CPT

## 2020-05-26 PROCEDURE — 80048 BASIC METABOLIC PNL TOTAL CA: CPT

## 2020-05-26 PROCEDURE — 3008F BODY MASS INDEX DOCD: CPT | Performed by: FAMILY MEDICINE

## 2020-05-26 PROCEDURE — 3044F HG A1C LEVEL LT 7.0%: CPT | Performed by: FAMILY MEDICINE

## 2020-05-26 PROCEDURE — 99213 OFFICE O/P EST LOW 20 MIN: CPT | Performed by: FAMILY MEDICINE

## 2020-05-26 PROCEDURE — 2022F DILAT RTA XM EVC RTNOPTHY: CPT | Performed by: FAMILY MEDICINE

## 2020-05-26 RX ORDER — ATORVASTATIN CALCIUM 10 MG/1
10 TABLET, FILM COATED ORAL DAILY
Qty: 90 TABLET | Refills: 3 | Status: SHIPPED | OUTPATIENT
Start: 2020-05-26 | End: 2020-09-29 | Stop reason: DRUGHIGH

## 2020-09-28 ENCOUNTER — APPOINTMENT (OUTPATIENT)
Dept: LAB | Facility: CLINIC | Age: 50
End: 2020-09-28
Payer: COMMERCIAL

## 2020-09-28 ENCOUNTER — OFFICE VISIT (OUTPATIENT)
Dept: FAMILY MEDICINE CLINIC | Facility: CLINIC | Age: 50
End: 2020-09-28
Payer: COMMERCIAL

## 2020-09-28 VITALS
DIASTOLIC BLOOD PRESSURE: 70 MMHG | WEIGHT: 163.7 LBS | HEIGHT: 68 IN | HEART RATE: 67 BPM | TEMPERATURE: 97 F | BODY MASS INDEX: 24.81 KG/M2 | SYSTOLIC BLOOD PRESSURE: 120 MMHG | OXYGEN SATURATION: 97 %

## 2020-09-28 DIAGNOSIS — E78.00 PURE HYPERCHOLESTEROLEMIA: ICD-10-CM

## 2020-09-28 DIAGNOSIS — E11.9 TYPE 2 DIABETES MELLITUS WITHOUT COMPLICATION, WITHOUT LONG-TERM CURRENT USE OF INSULIN (HCC): ICD-10-CM

## 2020-09-28 DIAGNOSIS — L30.9 DERMATITIS: ICD-10-CM

## 2020-09-28 DIAGNOSIS — Z23 IMMUNIZATION DUE: Primary | ICD-10-CM

## 2020-09-28 LAB
ALBUMIN SERPL BCP-MCNC: 3.9 G/DL (ref 3.5–5)
ALP SERPL-CCNC: 68 U/L (ref 46–116)
ALT SERPL W P-5'-P-CCNC: 39 U/L (ref 12–78)
ANION GAP SERPL CALCULATED.3IONS-SCNC: 6 MMOL/L (ref 4–13)
AST SERPL W P-5'-P-CCNC: 26 U/L (ref 5–45)
BILIRUB SERPL-MCNC: 1.27 MG/DL (ref 0.2–1)
BUN SERPL-MCNC: 18 MG/DL (ref 5–25)
CALCIUM SERPL-MCNC: 9.1 MG/DL (ref 8.3–10.1)
CHLORIDE SERPL-SCNC: 109 MMOL/L (ref 100–108)
CHOLEST SERPL-MCNC: 157 MG/DL (ref 50–200)
CO2 SERPL-SCNC: 25 MMOL/L (ref 21–32)
CREAT SERPL-MCNC: 0.77 MG/DL (ref 0.6–1.3)
CREAT UR-MCNC: 192 MG/DL
EST. AVERAGE GLUCOSE BLD GHB EST-MCNC: 151 MG/DL
GFR SERPL CREATININE-BSD FRML MDRD: 107 ML/MIN/1.73SQ M
GLUCOSE P FAST SERPL-MCNC: 144 MG/DL (ref 65–99)
HBA1C MFR BLD: 6.9 %
HDLC SERPL-MCNC: 49 MG/DL
LDLC SERPL CALC-MCNC: 84 MG/DL (ref 0–100)
MICROALBUMIN UR-MCNC: 13.1 MG/L (ref 0–20)
MICROALBUMIN/CREAT 24H UR: 7 MG/G CREATININE (ref 0–30)
NONHDLC SERPL-MCNC: 108 MG/DL
POTASSIUM SERPL-SCNC: 3.9 MMOL/L (ref 3.5–5.3)
PROT SERPL-MCNC: 7.6 G/DL (ref 6.4–8.2)
SODIUM SERPL-SCNC: 140 MMOL/L (ref 136–145)
TRIGL SERPL-MCNC: 121 MG/DL

## 2020-09-28 PROCEDURE — 90682 RIV4 VACC RECOMBINANT DNA IM: CPT

## 2020-09-28 PROCEDURE — 80053 COMPREHEN METABOLIC PANEL: CPT

## 2020-09-28 PROCEDURE — 82570 ASSAY OF URINE CREATININE: CPT | Performed by: FAMILY MEDICINE

## 2020-09-28 PROCEDURE — 99213 OFFICE O/P EST LOW 20 MIN: CPT

## 2020-09-28 PROCEDURE — 90471 IMMUNIZATION ADMIN: CPT

## 2020-09-28 PROCEDURE — 82043 UR ALBUMIN QUANTITATIVE: CPT | Performed by: FAMILY MEDICINE

## 2020-09-28 PROCEDURE — 83036 HEMOGLOBIN GLYCOSYLATED A1C: CPT

## 2020-09-28 PROCEDURE — 36415 COLL VENOUS BLD VENIPUNCTURE: CPT

## 2020-09-28 PROCEDURE — 80061 LIPID PANEL: CPT

## 2020-09-28 RX ORDER — CLOBETASOL PROPIONATE 0.5 MG/G
CREAM TOPICAL 2 TIMES DAILY
Qty: 30 G | Refills: 0 | Status: SHIPPED | OUTPATIENT
Start: 2020-09-28 | End: 2021-01-05 | Stop reason: ALTCHOICE

## 2020-09-28 NOTE — ASSESSMENT & PLAN NOTE
I am not quite sure what I am seeing here because it is scratched quite a bit  A could be that the patient simply had a dermatitis and has some bruising from scratching  My concern is that it could possibly be a melanoma that he has scratched unchanged appearance of, making it unrecognizable  I am going to prescribe some clobetasol cream to apply twice a day to the affected area  This should take care of the itching and hopefully help it heal   If it is still there after 2 weeks or so, then I am going to have the patient return to the office in biopsy the lesion

## 2020-09-28 NOTE — PROGRESS NOTES
Assessment/Plan:    Type 2 diabetes mellitus, without long-term current use of insulin (HCC)    Lab Results   Component Value Date    HGBA1C 6 4 (H) 05/26/2020    Patient has type 2 diabetes mellitus  He is overdue for his dilated eye exam   Apparently, he was due in the spring but canceled because of concerns about COVID-19  I asked the patient's wife to schedule him a dilated eye exam   I gave her a paper to give to the ophthalmologist   We discussed routine diabetic foot care and written instructions were given  Diabetes has been under excellent control  I note that the patient did gain 6-1/2 lb and we discussed this  I recommended he try to lose weight and become more physically active, perhaps walk for exercise  I recheck the patient's blood pressure myself and found to be 126/80  Continue metformin  Pure hypercholesterolemia  Continue atorvastatin 10 mg daily  His goal LDL cholesterol is less than 70  I ordered a fasting lipid panel to be done  Dermatitis  I am not quite sure what I am seeing here because it is scratched quite a bit  A could be that the patient simply had a dermatitis and has some bruising from scratching  My concern is that it could possibly be a melanoma that he has scratched unchanged appearance of, making it unrecognizable  I am going to prescribe some clobetasol cream to apply twice a day to the affected area  This should take care of the itching and hopefully help it heal   If it is still there after 2 weeks or so, then I am going to have the patient return to the office in biopsy the lesion         Diagnoses and all orders for this visit:    Immunization due  -     influenza vaccine, quadrivalent, recombinant, PF, 0 5 mL, for patients 18 yr+ (FLUBLOK)    Type 2 diabetes mellitus without complication, without long-term current use of insulin (HCC)  -     Microalbumin / creatinine urine ratio  -     Hemoglobin A1C; Future  -     Comprehensive metabolic panel; Future    Pure hypercholesterolemia  -     Lipid panel; Future    Dermatitis  -     clobetasol (TEMOVATE) 0 05 % cream; Apply topically 2 (two) times a day        BMI Counseling: Body mass index is 25 26 kg/m²  The BMI is above normal  Nutrition recommendations include reducing portion sizes and decreasing overall calorie intake  Exercise recommendations include moderate aerobic physical activity for 150 minutes/week  Subjective:      Patient ID: Alan Christine is a 53 y o  male  This is a 60-year-old [place] male who presents to the office today with his wife  The patient is here today for his routine checkup  He is doing well  His only pt is an itchy spot on his leg  It looked like a bruise he has had it for 30 does not taking his leg  The following portions of the patient's history were reviewed and updated as appropriate: allergies, current medications, past family history, past medical history, past social history, past surgical history and problem list     Review of Systems   Cardiovascular: Negative for chest pain, palpitations and leg swelling  Gastrointestinal: Negative for abdominal distention, abdominal pain, blood in stool, constipation, diarrhea, nausea and vomiting  Skin: Positive for rash  Positive for pruritus         Objective:      /70 (BP Location: Left arm, Patient Position: Sitting, Cuff Size: Adult)   Pulse 67   Temp (!) 97 °F (36 1 °C) (Tympanic)   Ht 5' 7 5" (1 715 m)   Wt 74 3 kg (163 lb 11 2 oz)   SpO2 97%   BMI 25 26 kg/m²          Physical Exam  Vitals signs reviewed  Constitutional:       Comments: This patient is a 60-year-old  male who appears his stated age  He is pleasant, cooperative, and in no distress  HENT:      Head: Normocephalic and atraumatic        Right Ear: Tympanic membrane, ear canal and external ear normal       Left Ear: Tympanic membrane, ear canal and external ear normal       Mouth/Throat:      Mouth: Mucous membranes are moist  Pharynx: Oropharynx is clear  No oropharyngeal exudate or posterior oropharyngeal erythema  Eyes:      General: No scleral icterus  Right eye: No discharge  Left eye: No discharge  Conjunctiva/sclera: Conjunctivae normal       Pupils: Pupils are equal, round, and reactive to light  Neck:      Musculoskeletal: Neck supple  Vascular: No carotid bruit  Comments: No thyromegaly is noted  Cardiovascular:      Rate and Rhythm: Normal rate and regular rhythm  Heart sounds: No murmur  No friction rub  No gallop  Pulmonary:      Effort: Pulmonary effort is normal  No respiratory distress  Breath sounds: Normal breath sounds  No stridor  No wheezing, rhonchi or rales  Abdominal:      General: Abdomen is flat  Bowel sounds are normal  There is no distension  Palpations: Abdomen is soft  There is no mass  Tenderness: There is no abdominal tenderness  There is no guarding  Comments: No hepatosplenomegaly was noted   Lymphadenopathy:      Cervical: No cervical adenopathy  Skin:     Comments: On the anterior tibial region of the right leg, there is a reddened irregularly-shaped skin lesion present with excoriations all around it  It appears perhaps it is a brown pigment but it appears red, perhaps from scratching   Psychiatric:         Mood and Affect: Mood normal          Behavior: Behavior normal          Thought Content:  Thought content normal          Judgment: Judgment normal

## 2020-09-28 NOTE — PATIENT INSTRUCTIONS
Foot Care for People with Diabetes   AMBULATORY CARE:   What you need to know about foot care:   · Foot care helps protect your feet and prevent foot ulcers or sores  Long-term high blood sugar levels can damage the blood vessels and nerves in your legs and feet  This damage makes it hard to feel pressure, pain, temperature, and touch  You may not be able to feel a cut or sore, or shoes that are too tight  Foot care is needed to prevent serious problems, such as an infection or amputation  · Diabetes may cause your toes to become crooked or curved under  These changes may affect the way you walk and can lead to increased pressure on your foot  The pressure can decrease blood flow to your feet  Lack of blood flow increases your risk for a foot ulcer  Do not ignore small problems, such as dry skin or small wounds  These can become life-threatening over time without proper care  Contact your healthcare provider if:   · Your feet become numb, weak, or hard to move  · You have pus draining from a sore on your foot  · You have a wound on your foot that gets bigger, deeper, or does not heal      · You see blisters, cuts, scratches, calluses, or sores on your foot  · You have a fever, and your feet become red, warm, and swollen  · Your toenails become thick, curled, or yellow  · You find it hard to check your feet because your vision is poor  · You have questions or concerns about your condition or care  How to care for your feet:   · Check your feet each day  Look at your whole foot, including the bottom, and between and under your toes  Check for wounds, corns, and calluses  Use a mirror to see the bottom of your feet  The skin on your feet may be shiny, tight, or darker than normal  Your feet may also be cold and pale  Feel your feet by running your hands along the tops, bottoms, sides, and between your toes   Redness, swelling, and warmth are signs of blood flow problems that can lead to a foot ulcer  Do not try to remove corns or calluses yourself  · Wash your feet each day with soap and warm water  Do not use hot water, because this can injure your foot  Dry your feet gently with a towel after you wash them  Dry between and under your toes  · Apply lotion or a moisturizer on your dry feet  Ask your healthcare provider what lotions are best to use  Do not put lotion or moisturizer between your toes  · Cut your toenails correctly  File or cut your toenails straight across  Use a soft brush to clean around your toenails  If your toenails are very thick, you may need to have a healthcare provider or specialist cut them  · Protect your feet  Do not walk barefoot or wear your shoes without socks  Check your shoes for rocks or other objects that can hurt your feet  Wear cotton socks to help keep your feet dry  Wear socks without toe seams, or wear them with the seams inside out  Change your socks each day  Do not wear socks that are dirty or damp  · Wear shoes that fit well  Wear shoes that do not rub against any area of your feet  Your shoes should be ½ to ¾ inch (1 to 2 centimeters) longer than your feet  Your shoes should also have extra space around the widest part of your feet  Walking or athletic shoes with laces or straps that adjust are best  Ask your healthcare provider for help to choose shoes that fit you best  Ask him if you need to wear an insert, orthotic, or bandage on your feet  · Go to your follow-up visits  Your healthcare provider will do a foot exam at least once a year  You may need a foot exam more often if you have nerve damage, foot deformities, or ulcers  He will check for nerve damage and how well you can feel your feet  He will check your shoes to see if they fit well  Follow up with your healthcare provider or foot specialist as directed: You will need to have your feet checked at least once a year   You may need a foot exam more often if you have nerve damage, foot deformities, or ulcers  Write down your questions so you remember to ask them during your visits  © 2017 [address] Information is for End User's use only and may not be sold, redistributed or otherwise used for commercial purposes  All illustrations and images included in CareNotes® are the copyrighted property of A D A M [employer] Inc  or Troy Srinivasan  The above information is an  only  It is not intended as medical advice for individual conditions or treatments  Talk to your doctor, nurse or pharmacist before following any medical regimen to see if it is safe and effective for you

## 2020-09-28 NOTE — ASSESSMENT & PLAN NOTE
Continue atorvastatin 10 mg daily  His goal LDL cholesterol is less than 70  I ordered a fasting lipid panel to be done

## 2020-09-28 NOTE — ASSESSMENT & PLAN NOTE
Lab Results   Component Value Date    HGBA1C 6 4 (H) 05/26/2020    Patient has type 2 diabetes mellitus  He is overdue for his dilated eye exam   Apparently, he was due in the spring but canceled because of concerns about COVID-19  I asked the patient's wife to schedule him a dilated eye exam   I gave her a paper to give to the ophthalmologist   We discussed routine diabetic foot care and written instructions were given  Diabetes has been under excellent control  I note that the patient did gain 6-1/2 lb and we discussed this  I recommended he try to lose weight and become more physically active, perhaps walk for exercise  I recheck the patient's blood pressure myself and found to be 126/80  Continue metformin

## 2020-09-29 DIAGNOSIS — E78.00 PURE HYPERCHOLESTEROLEMIA: Primary | ICD-10-CM

## 2020-09-29 RX ORDER — ATORVASTATIN CALCIUM 20 MG/1
20 TABLET, FILM COATED ORAL DAILY
Qty: 30 TABLET | Refills: 5 | Status: SHIPPED | OUTPATIENT
Start: 2020-09-29 | End: 2021-04-13 | Stop reason: SDUPTHER

## 2020-12-15 ENCOUNTER — OFFICE VISIT (OUTPATIENT)
Dept: FAMILY MEDICINE CLINIC | Facility: CLINIC | Age: 50
End: 2020-12-15
Payer: COMMERCIAL

## 2020-12-15 VITALS
WEIGHT: 162.7 LBS | TEMPERATURE: 97.9 F | BODY MASS INDEX: 24.66 KG/M2 | HEIGHT: 68 IN | HEART RATE: 79 BPM | DIASTOLIC BLOOD PRESSURE: 82 MMHG | OXYGEN SATURATION: 98 % | SYSTOLIC BLOOD PRESSURE: 120 MMHG

## 2020-12-15 DIAGNOSIS — B02.9 HERPES ZOSTER WITHOUT COMPLICATION: Primary | ICD-10-CM

## 2020-12-15 PROCEDURE — 99212 OFFICE O/P EST SF 10 MIN: CPT | Performed by: FAMILY MEDICINE

## 2020-12-15 RX ORDER — VALACYCLOVIR HYDROCHLORIDE 1 G/1
1000 TABLET, FILM COATED ORAL 3 TIMES DAILY
Qty: 21 TABLET | Refills: 0 | Status: SHIPPED | OUTPATIENT
Start: 2020-12-15 | End: 2021-01-05 | Stop reason: ALTCHOICE

## 2020-12-15 RX ORDER — PREDNISONE 20 MG/1
TABLET ORAL
Qty: 14 TABLET | Refills: 0 | Status: SHIPPED | OUTPATIENT
Start: 2020-12-15 | End: 2021-01-05 | Stop reason: ALTCHOICE

## 2020-12-29 ENCOUNTER — OFFICE VISIT (OUTPATIENT)
Dept: FAMILY MEDICINE CLINIC | Facility: CLINIC | Age: 50
End: 2020-12-29
Payer: COMMERCIAL

## 2020-12-29 VITALS
TEMPERATURE: 97.4 F | DIASTOLIC BLOOD PRESSURE: 64 MMHG | SYSTOLIC BLOOD PRESSURE: 98 MMHG | HEART RATE: 78 BPM | WEIGHT: 159.6 LBS | OXYGEN SATURATION: 96 % | BODY MASS INDEX: 24.19 KG/M2 | HEIGHT: 68 IN

## 2020-12-29 DIAGNOSIS — B02.9 HERPES ZOSTER WITHOUT COMPLICATION: Primary | ICD-10-CM

## 2020-12-29 PROCEDURE — 99212 OFFICE O/P EST SF 10 MIN: CPT | Performed by: FAMILY MEDICINE

## 2021-01-05 ENCOUNTER — OFFICE VISIT (OUTPATIENT)
Dept: FAMILY MEDICINE CLINIC | Facility: CLINIC | Age: 51
End: 2021-01-05
Payer: COMMERCIAL

## 2021-01-05 ENCOUNTER — APPOINTMENT (OUTPATIENT)
Dept: LAB | Facility: CLINIC | Age: 51
End: 2021-01-05
Payer: COMMERCIAL

## 2021-01-05 VITALS
BODY MASS INDEX: 25.07 KG/M2 | SYSTOLIC BLOOD PRESSURE: 112 MMHG | HEART RATE: 56 BPM | DIASTOLIC BLOOD PRESSURE: 78 MMHG | HEIGHT: 67 IN | WEIGHT: 159.7 LBS | OXYGEN SATURATION: 99 % | TEMPERATURE: 97.6 F

## 2021-01-05 DIAGNOSIS — E11.9 TYPE 2 DIABETES MELLITUS WITHOUT COMPLICATION, WITHOUT LONG-TERM CURRENT USE OF INSULIN (HCC): ICD-10-CM

## 2021-01-05 DIAGNOSIS — E78.00 PURE HYPERCHOLESTEROLEMIA: ICD-10-CM

## 2021-01-05 DIAGNOSIS — B02.9 HERPES ZOSTER WITHOUT COMPLICATION: Primary | ICD-10-CM

## 2021-01-05 LAB
ANION GAP SERPL CALCULATED.3IONS-SCNC: 1 MMOL/L (ref 4–13)
BUN SERPL-MCNC: 17 MG/DL (ref 5–25)
CALCIUM SERPL-MCNC: 9.2 MG/DL (ref 8.3–10.1)
CHLORIDE SERPL-SCNC: 110 MMOL/L (ref 100–108)
CO2 SERPL-SCNC: 30 MMOL/L (ref 21–32)
CREAT SERPL-MCNC: 0.81 MG/DL (ref 0.6–1.3)
EST. AVERAGE GLUCOSE BLD GHB EST-MCNC: 151 MG/DL
GFR SERPL CREATININE-BSD FRML MDRD: 104 ML/MIN/1.73SQ M
GLUCOSE P FAST SERPL-MCNC: 151 MG/DL (ref 65–99)
HBA1C MFR BLD: 6.9 %
POTASSIUM SERPL-SCNC: 4.3 MMOL/L (ref 3.5–5.3)
SODIUM SERPL-SCNC: 141 MMOL/L (ref 136–145)

## 2021-01-05 PROCEDURE — 83036 HEMOGLOBIN GLYCOSYLATED A1C: CPT

## 2021-01-05 PROCEDURE — 99213 OFFICE O/P EST LOW 20 MIN: CPT | Performed by: FAMILY MEDICINE

## 2021-01-05 PROCEDURE — 80048 BASIC METABOLIC PNL TOTAL CA: CPT

## 2021-01-05 PROCEDURE — 36415 COLL VENOUS BLD VENIPUNCTURE: CPT

## 2021-01-05 RX ORDER — HYDROXYZINE PAMOATE 25 MG/1
CAPSULE ORAL
Qty: 30 CAPSULE | Refills: 1 | Status: SHIPPED | OUTPATIENT
Start: 2021-01-05 | End: 2021-01-27

## 2021-01-05 NOTE — PATIENT INSTRUCTIONS
Foot Care for People with Diabetes   AMBULATORY CARE:   What you need to know about foot care:   · Foot care helps protect your feet and prevent foot ulcers or sores  Long-term high blood sugar levels can damage the blood vessels and nerves in your legs and feet  This damage makes it hard to feel pressure, pain, temperature, and touch  You may not be able to feel a cut or sore, or shoes that are too tight  Foot care is needed to prevent serious problems, such as an infection or amputation  · Diabetes may cause your toes to become crooked or curved under  These changes may affect the way you walk and can lead to increased pressure on your foot  The pressure can decrease blood flow to your feet  Lack of blood flow increases your risk for a foot ulcer  Do not ignore small problems, such as dry skin or small wounds  These can become life-threatening over time without proper care  Call your care team provider if:   · Your feet become numb, weak, or hard to move  · You have pus draining from a sore on your foot  · You have a wound on your foot that gets bigger, deeper, or does not heal      · You see blisters, cuts, scratches, calluses, or sores on your foot  · You have a fever, and your feet become red, warm, and swollen  · Your toenails become thick, curled, or yellow  · You find it hard to check your feet because your vision is poor  · You have questions or concerns about your condition or care  How to care for your feet:   · Check your feet each day  Look at your whole foot, including the bottom, and between and under your toes  Check for wounds, corns, and calluses  Use a mirror to see the bottom of your feet  The skin on your feet may be shiny, tight, or darker than normal  Your feet may also be cold and pale  Feel your feet by running your hands along the tops, bottoms, sides, and between your toes   Redness, swelling, and warmth are signs of blood flow problems that can lead to a foot ulcer  Do not try to remove corns or calluses yourself  · Wash your feet each day with soap and warm water  Do not use hot water, because this can injure your foot  Dry your feet gently with a towel after you wash them  Dry between and under your toes  · Apply lotion or a moisturizer on your dry feet  Ask your care team provider what lotions are best to use  Do not put lotion or moisturizer between your toes  Moisture between your toes could lead to skin breakdown  · Cut your toenails correctly  File or cut your toenails straight across  Use a soft brush to clean around your toenails  If your toenails are very thick, you may need to have a care team provider or specialist cut them  · Protect your feet  Do not walk barefoot or wear your shoes without socks  Check your shoes for rocks or other objects that can hurt your feet  Wear cotton socks to help keep your feet dry  Wear socks without toe seams, or wear them with the seams inside out  Change your socks each day  Do not wear socks that are dirty or damp  · Wear shoes that fit well  Wear shoes that do not rub against any area of your feet  Your shoes should be ½ to ¾ inch (1 to 2 centimeters) longer than your feet  Your shoes should also have extra space around the widest part of your feet  Walking or athletic shoes with laces or straps that adjust are best  Ask your care team provider for help to choose shoes that fit you best  Ask him or her if you need to wear an insert, orthotic, or bandage on your feet  · Go to your follow-up visits  Your care team provider will do a foot exam at least once a year  You may need a foot exam more often if you have nerve damage, foot deformities, or ulcers  He will check for nerve damage and how well you can feel your feet  He will check your shoes to see if they fit well  · Do not smoke  Smoking can damage your blood vessels and put you at increased risk for foot ulcers   Ask your care team provider for information if you currently smoke and need help to quit  E-cigarettes or smokeless tobacco still contain nicotine  Talk to your care team provider before you use these products  Follow up with your diabetes care team provider or foot specialist as directed: You will need to have your feet checked at least once a year  You may need a foot exam more often if you have nerve damage, foot deformities, or ulcers  Write down your questions so you remember to ask them during your visits  © Copyright [address] Information is for End User's use only and may not be sold, redistributed or otherwise used for commercial purposes  All illustrations and images included in CareNotes® are the copyrighted property of A D A M , Inc  or [address] [place], [place] [address]blake   The above information is an  only  It is not intended as medical advice for individual conditions or treatments  Talk to your doctor, nurse or pharmacist before following any medical regimen to see if it is safe and effective for you

## 2021-01-05 NOTE — PROGRESS NOTES
Assessment/Plan:    Type 2 diabetes mellitus, without long-term current use of insulin ([facility])    Lab Results   Component Value Date    HGBA1C 6 9 (H) 01/05/2021    Patient has diabetes which is generally well controlled  I ordered a BMP and hemoglobin A1c  I did tell the patient and his daughter that the prednisone he took likely put his blood sugar up  His hemoglobin A1c may be affected by this as well  At the time of the office visit, I did not have his labs  By the time I dictated his note this evening, his labs have been obtained  It appears his hemoglobin A1c is under control  I am not going to make any change with his metformin dose  I did discussed routine diabetic foot care  Pure hypercholesterolemia  Patient has hyperlipidemia  He will continue atorvastatin 20 mg daily    Herpes zoster without complication  I believe this patient's itching is a manifestation of post herpetic neuralgia  His symptoms are very mild  Rather than treat him for post herpetic neuralgia, I am just going to give him a medication to take at bedtime for itching  I started him on Atarax 25 mg at bedtime  I told him he can discontinue the medication when he no longer needs it       Diagnoses and all orders for this visit:    Herpes zoster without complication  -     hydrOXYzine pamoate (VISTARIL) 25 mg capsule; Take 1 capsule at bedtime    Type 2 diabetes mellitus without complication, without long-term current use of insulin (HCC)  -     Basic metabolic panel; Future  -     Hemoglobin A1C; Future    Pure hypercholesterolemia          Subjective:      Patient ID: Elizabeth Andrews is a 53 y o  male  This is a 20-year-old [facility] male who presents to the office today for his routine checkup  He is accompanied to the office today by his daughter  The patient has been doing well  His only complaint is mild itching from his post herpetic neuralgia  This really only bothers him at night    He asks if he can have a prescription to treat that  Otherwise, he has been doing well  He remains active, working in his restaurant  He reports compliance with his medication  He feels well overall  The following portions of the patient's history were reviewed and updated as appropriate: allergies, current medications, past family history, past medical history, past social history, past surgical history and problem list     Review of Systems   Constitutional: Negative for activity change and appetite change  Cardiovascular: Negative for chest pain, palpitations and leg swelling  Gastrointestinal: Negative for abdominal distention, abdominal pain, blood in stool, constipation, diarrhea, nausea and vomiting  Skin:        Positive for pruritus         Objective:      /78 (BP Location: Left arm, Patient Position: Sitting, Cuff Size: Large)   Pulse 56   Temp 97 6 °F (36 4 °C) (Temporal)   Ht 5' 7" (1 702 m)   Wt 72 4 kg (159 lb 11 2 oz)   SpO2 99%   BMI 25 01 kg/m²          Physical Exam  Vitals signs reviewed  Constitutional:       Comments: This patient is a 60-year-old  male who appears his stated age  He is pleasant and cooperative  He was in no distress   HENT:      Head: Normocephalic and atraumatic  Right Ear: Tympanic membrane, ear canal and external ear normal       Left Ear: Tympanic membrane, ear canal and external ear normal       Mouth/Throat:      Mouth: Mucous membranes are moist       Pharynx: Oropharynx is clear  No oropharyngeal exudate or posterior oropharyngeal erythema  Eyes:      General: No scleral icterus  Right eye: No discharge  Left eye: No discharge  Conjunctiva/sclera: Conjunctivae normal       Pupils: Pupils are equal, round, and reactive to light  Neck:      Musculoskeletal: Neck supple  Vascular: No carotid bruit  Comments: There was no thyromegaly appreciated  Cardiovascular:      Rate and Rhythm: Normal rate and regular rhythm        Heart sounds: Normal heart sounds  No murmur  No friction rub  No gallop  Pulmonary:      Effort: Pulmonary effort is normal  No respiratory distress  Breath sounds: Normal breath sounds  No stridor  No wheezing, rhonchi or rales  Abdominal:      General: Bowel sounds are normal  There is no distension  Palpations: Abdomen is soft  There is no mass  Tenderness: There is no abdominal tenderness  There is no guarding  Comments: There was no organomegaly noted   Lymphadenopathy:      Cervical: No cervical adenopathy  Psychiatric:         Mood and Affect: Mood normal          Behavior: Behavior normal          Thought Content:  Thought content normal          Judgment: Judgment normal        extremities:  Without cyanosis, clubbing, or edema

## 2021-01-06 NOTE — ASSESSMENT & PLAN NOTE
I believe this patient's itching is a manifestation of post herpetic neuralgia  His symptoms are very mild  Rather than treat him for post herpetic neuralgia, I am just going to give him a medication to take at bedtime for itching  I started him on Atarax 25 mg at bedtime    I told him he can discontinue the medication when he no longer needs it

## 2021-01-06 NOTE — ASSESSMENT & PLAN NOTE
Lab Results   Component Value Date    HGBA1C 6 9 (H) 01/05/2021    Patient has diabetes which is generally well controlled  I ordered a BMP and hemoglobin A1c  I did tell the patient and his daughter that the prednisone he took likely put his blood sugar up  His hemoglobin A1c may be affected by this as well  At the time of the office visit, I did not have his labs  By the time I dictated his note this evening, his labs have been obtained  It appears his hemoglobin A1c is under control  I am not going to make any change with his metformin dose  I did discussed routine diabetic foot care

## 2021-01-27 DIAGNOSIS — B02.9 HERPES ZOSTER WITHOUT COMPLICATION: ICD-10-CM

## 2021-01-27 RX ORDER — HYDROXYZINE PAMOATE 25 MG/1
CAPSULE ORAL
Qty: 30 CAPSULE | Refills: 1 | Status: SHIPPED | OUTPATIENT
Start: 2021-01-27 | End: 2021-02-10

## 2021-02-10 DIAGNOSIS — B02.9 HERPES ZOSTER WITHOUT COMPLICATION: ICD-10-CM

## 2021-02-10 RX ORDER — HYDROXYZINE PAMOATE 25 MG/1
CAPSULE ORAL
Qty: 90 CAPSULE | Refills: 1 | Status: SHIPPED | OUTPATIENT
Start: 2021-02-10

## 2021-04-13 ENCOUNTER — APPOINTMENT (OUTPATIENT)
Dept: LAB | Facility: CLINIC | Age: 51
End: 2021-04-13
Payer: COMMERCIAL

## 2021-04-13 ENCOUNTER — OFFICE VISIT (OUTPATIENT)
Dept: FAMILY MEDICINE CLINIC | Facility: CLINIC | Age: 51
End: 2021-04-13
Payer: COMMERCIAL

## 2021-04-13 VITALS
OXYGEN SATURATION: 97 % | BODY MASS INDEX: 23.7 KG/M2 | SYSTOLIC BLOOD PRESSURE: 126 MMHG | HEIGHT: 68 IN | HEART RATE: 67 BPM | DIASTOLIC BLOOD PRESSURE: 80 MMHG | TEMPERATURE: 97.6 F | WEIGHT: 156.4 LBS

## 2021-04-13 DIAGNOSIS — Z12.5 PROSTATE CANCER SCREENING: ICD-10-CM

## 2021-04-13 DIAGNOSIS — J30.1 SEASONAL ALLERGIC RHINITIS DUE TO POLLEN: ICD-10-CM

## 2021-04-13 DIAGNOSIS — E11.9 TYPE 2 DIABETES MELLITUS WITHOUT COMPLICATION, WITHOUT LONG-TERM CURRENT USE OF INSULIN (HCC): Primary | ICD-10-CM

## 2021-04-13 DIAGNOSIS — R07.89 ATYPICAL CHEST PAIN: ICD-10-CM

## 2021-04-13 DIAGNOSIS — E78.00 PURE HYPERCHOLESTEROLEMIA: ICD-10-CM

## 2021-04-13 DIAGNOSIS — E11.9 TYPE 2 DIABETES MELLITUS WITHOUT COMPLICATION, WITHOUT LONG-TERM CURRENT USE OF INSULIN (HCC): ICD-10-CM

## 2021-04-13 DIAGNOSIS — Z12.11 SCREENING FOR MALIGNANT NEOPLASM OF COLON: ICD-10-CM

## 2021-04-13 LAB
ALBUMIN SERPL BCP-MCNC: 3.9 G/DL (ref 3.5–5)
ALP SERPL-CCNC: 72 U/L (ref 46–116)
ALT SERPL W P-5'-P-CCNC: 32 U/L (ref 12–78)
ANION GAP SERPL CALCULATED.3IONS-SCNC: 2 MMOL/L (ref 4–13)
AST SERPL W P-5'-P-CCNC: 17 U/L (ref 5–45)
BILIRUB SERPL-MCNC: 1 MG/DL (ref 0.2–1)
BUN SERPL-MCNC: 16 MG/DL (ref 5–25)
CALCIUM SERPL-MCNC: 9.1 MG/DL (ref 8.3–10.1)
CHLORIDE SERPL-SCNC: 109 MMOL/L (ref 100–108)
CHOLEST SERPL-MCNC: 127 MG/DL (ref 50–200)
CO2 SERPL-SCNC: 30 MMOL/L (ref 21–32)
CREAT SERPL-MCNC: 0.86 MG/DL (ref 0.6–1.3)
EST. AVERAGE GLUCOSE BLD GHB EST-MCNC: 143 MG/DL
GFR SERPL CREATININE-BSD FRML MDRD: 101 ML/MIN/1.73SQ M
GLUCOSE P FAST SERPL-MCNC: 149 MG/DL (ref 65–99)
HBA1C MFR BLD: 6.6 %
HDLC SERPL-MCNC: 52 MG/DL
LDLC SERPL CALC-MCNC: 63 MG/DL (ref 0–100)
NONHDLC SERPL-MCNC: 75 MG/DL
POTASSIUM SERPL-SCNC: 4.5 MMOL/L (ref 3.5–5.3)
PROT SERPL-MCNC: 7.6 G/DL (ref 6.4–8.2)
PSA SERPL-MCNC: 0.6 NG/ML (ref 0–4)
SODIUM SERPL-SCNC: 141 MMOL/L (ref 136–145)
TRIGL SERPL-MCNC: 58 MG/DL

## 2021-04-13 PROCEDURE — 93000 ELECTROCARDIOGRAM COMPLETE: CPT | Performed by: FAMILY MEDICINE

## 2021-04-13 PROCEDURE — 36415 COLL VENOUS BLD VENIPUNCTURE: CPT

## 2021-04-13 PROCEDURE — 83036 HEMOGLOBIN GLYCOSYLATED A1C: CPT

## 2021-04-13 PROCEDURE — G0103 PSA SCREENING: HCPCS

## 2021-04-13 PROCEDURE — 99214 OFFICE O/P EST MOD 30 MIN: CPT | Performed by: FAMILY MEDICINE

## 2021-04-13 PROCEDURE — 80061 LIPID PANEL: CPT

## 2021-04-13 PROCEDURE — 80053 COMPREHEN METABOLIC PANEL: CPT

## 2021-04-13 RX ORDER — ATORVASTATIN CALCIUM 20 MG/1
20 TABLET, FILM COATED ORAL DAILY
Qty: 30 TABLET | Refills: 5 | Status: SHIPPED | OUTPATIENT
Start: 2021-04-13 | End: 2021-12-13 | Stop reason: SDUPTHER

## 2021-04-13 RX ORDER — MONTELUKAST SODIUM 10 MG/1
10 TABLET ORAL
Qty: 30 TABLET | Refills: 5 | Status: SHIPPED | OUTPATIENT
Start: 2021-04-13 | End: 2022-03-07

## 2021-04-13 RX ORDER — OLOPATADINE HYDROCHLORIDE 1 MG/ML
1 SOLUTION/ DROPS OPHTHALMIC 2 TIMES DAILY PRN
Qty: 5 ML | Refills: 5 | Status: SHIPPED | OUTPATIENT
Start: 2021-04-13

## 2021-04-13 NOTE — PATIENT INSTRUCTIONS
??   ??????   ?? ??????????????????????????????????????????????????????????????????? (???)??????????????????????????????????????????????????????????????????????????????????????????????????  ?????????????  · ?????    · ?????    · ????    · ?????????????????????    · ???????    · ??????????    ?????????? 911?  · ????????????????      ? ?????????????    ? ? ?? ?????????     § ???????????????????    § ????    § ?????    § ??????????      ???????????????  · ?????????????????    · ??????    · ?????????    · ??????????????    ???????????????  · ??????????????????      ????? ??????????????????????????  · ?? ????????????????????????????????????????    · ???????????????????? ?????????????? (NSAID)?????????????? (???????)?    · ???????????? ????????????????????????????????????    ? 72 ??????????????? ??????????????????????????????????????????????????????????????????????  ??????? ?????????????????????????????????????????????????  · ????? ????????????????????????????????????????????????????????????????????????????????????????????    · ??????????????? ????????????????????????????????????????????????????????    · ???????? ??????????????????????????????????????????????    · ?????????? ???????????????????????????????????????????????????????    · ???????? ???????????????????????????????????    · ?????????? ???????????? (??) ????????????????????????? 65 ?????????? 5 ???????????????????????    ???????????  · ????????????    · ???????????????    © Copyright [facility] Information is for End User's use only and may not be sold, redistributed or otherwise used for commercial purposes  All illustrations and images included in CareNotes® are the copyrighted property of [employer]  or Madeleine Rodriguez  The above information is an  only  It is not intended as medical advice for individual conditions or treatments   Talk to your doctor, nurse or pharmacist before following any medical regimen to see if it is safe and effective for you

## 2021-04-13 NOTE — PROGRESS NOTES
Assessment/Plan:    Type 2 diabetes mellitus, without long-term current use of insulin (HCC)    Lab Results   Component Value Date    HGBA1C 6 6 (H) 04/13/2021    CMP and hemoglobin A1c were ordered  The patient plans to go immediately after leaving the office for his labs  At the time he was in the office, he I obviously did not have his labs  It is now 9:18 p m  His hemoglobin A1c is noted to be 6 6% which is well controlled  He will continue metformin, dose unchanged  We again discussed routine diabetic foot care  Pure hypercholesterolemia    A fasting lipid panel has been ordered  His goal LDL cholesterol is less than 70  He will continue atorvastatin    Atypical chest pain   EKG was done in the office today  EKG showed a normal sinus rhythm  There were some nonspecific ST changes, otherwise normal EKG  I am going to order a chest x-ray as well as an exercise stress test to further evaluate his symptoms  Patient's wife acted as  and tells me he had a difficult time explaining to her and describing to her what his symptoms actually are    Seasonal allergic rhinitis due to pollen   Patient has seasonal allergic rhinitis  I started the patient back on Patanol eyedrops  I also started him on Singulair 10 mg daily he can use this through his allergy season  Diagnoses and all orders for this visit:    Type 2 diabetes mellitus without complication, without long-term current use of insulin (HCC)  -     metFORMIN (GLUCOPHAGE) 1000 MG tablet; Take 1 tablet (1,000 mg total) by mouth 2 (two) times a day with meals  -     Hemoglobin A1C; Future    Screening for malignant neoplasm of colon  -     Ambulatory referral to Gastroenterology; Future    Pure hypercholesterolemia  -     atorvastatin (LIPITOR) 20 mg tablet; Take 1 tablet (20 mg total) by mouth daily  -     Lipid panel; Future  -     Comprehensive metabolic panel;  Future    Atypical chest pain  -     Stress test only, exercise; Future  -     XR chest pa & lateral; Future  -     POCT ECG    Prostate cancer screening  -     PSA, Total Screen; Future    Seasonal allergic rhinitis due to pollen  -     olopatadine (PATANOL) 0 1 % ophthalmic solution; Administer 1 drop to both eyes 2 (two) times a day as needed (itchy eyes)  -     montelukast (SINGULAIR) 10 mg tablet; Take 1 tablet (10 mg total) by mouth daily at bedtime          Subjective:      Patient ID: Kevin Shone is a 49 y o  male  This is a 60-year-old [facility] male who presents to the office today for his routine checkup  His wife was present and acted as   The patient has been doing well  The patient's main complaint today is allergies  He reports runny nose, sneezing, and itchy eyes  He is taking his medication as prescribed  He tries to watch his diet  When I obtained through review of systems, the patient admitted to chest pain  He had a difficult time describing it  However, he has had several episodes of chest pain  The most recent occurred while he was cooking in [facility]  He had to sit down and wait 20 minutes for it to go away before he was able to resume  He could not characterize the chest pain  It did not radiate and was not associated with shortness of breath      The following portions of the patient's history were reviewed and updated as appropriate: allergies, current medications, past family history, past medical history, past social history, past surgical history and problem list     Review of Systems   HENT: Positive for congestion, rhinorrhea and sneezing  Eyes: Positive for itching  Respiratory: Negative for cough and shortness of breath  Cardiovascular: Positive for chest pain  Negative for palpitations and leg swelling  Gastrointestinal: Negative for abdominal distention, abdominal pain, blood in stool and constipation  Genitourinary: Negative for difficulty urinating     Allergic/Immunologic: Positive for environmental allergies  Objective:      /80   Pulse 67   Temp 97 6 °F (36 4 °C) (Temporal)   Ht 5' 7 5" (1 715 m)   Wt 70 9 kg (156 lb 6 4 oz)   SpO2 97%   BMI 24 13 kg/m²          Physical Exam  Vitals signs reviewed  Constitutional:       Comments: Patient is a 52-year-old [facility] male who appears his stated age  He is pleasant, cooperative, and in no distress  HENT:      Right Ear: Tympanic membrane, ear canal and external ear normal       Left Ear: Tympanic membrane, ear canal and external ear normal       Mouth/Throat:      Mouth: Mucous membranes are moist       Pharynx: Oropharynx is clear  No oropharyngeal exudate or posterior oropharyngeal erythema  Eyes:      General: No scleral icterus  Right eye: No discharge  Conjunctiva/sclera: Conjunctivae normal       Pupils: Pupils are equal, round, and reactive to light  Neck:      Musculoskeletal: Neck supple  Vascular: No carotid bruit  Comments: No thyromegaly  Cardiovascular:      Rate and Rhythm: Normal rate and regular rhythm  Heart sounds: No murmur  No friction rub  No gallop  Pulmonary:      Effort: Pulmonary effort is normal  No respiratory distress  Breath sounds: Normal breath sounds  No stridor  No wheezing, rhonchi or rales  Comments: Negative for pleuritic friction rub  Chest:      Chest wall: No tenderness  Abdominal:      General: Bowel sounds are normal  There is no distension  Palpations: Abdomen is soft  There is no mass  Tenderness: There is no abdominal tenderness  There is no guarding  Comments: No hepatosplenomegaly   Lymphadenopathy:      Cervical: No cervical adenopathy  Psychiatric:         Mood and Affect: Mood normal          Behavior: Behavior normal          Thought Content:  Thought content normal          Judgment: Judgment normal         Extremities: Without cyanosis, clubbing, or edema

## 2021-04-14 NOTE — ASSESSMENT & PLAN NOTE
Patient has seasonal allergic rhinitis  I started the patient back on Patanol eyedrops  I also started him on Singulair 10 mg daily he can use this through his allergy season

## 2021-04-14 NOTE — ASSESSMENT & PLAN NOTE
EKG was done in the office today  EKG showed a normal sinus rhythm  There were some nonspecific ST changes, otherwise normal EKG  I am going to order a chest x-ray as well as an exercise stress test to further evaluate his symptoms    Patient's wife acted as  and tells me he had a difficult time explaining to her and describing to her what his symptoms actually are

## 2021-04-14 NOTE — ASSESSMENT & PLAN NOTE
Lab Results   Component Value Date    HGBA1C 6 6 (H) 04/13/2021    CMP and hemoglobin A1c were ordered  The patient plans to go immediately after leaving the office for his labs  At the time he was in the office, he I obviously did not have his labs  It is now 9:18 p m  His hemoglobin A1c is noted to be 6 6% which is well controlled  He will continue metformin, dose unchanged  We again discussed routine diabetic foot care

## 2021-04-14 NOTE — ASSESSMENT & PLAN NOTE
A fasting lipid panel has been ordered  His goal LDL cholesterol is less than 70   He will continue atorvastatin

## 2021-08-13 ENCOUNTER — OFFICE VISIT (OUTPATIENT)
Dept: FAMILY MEDICINE CLINIC | Facility: CLINIC | Age: 51
End: 2021-08-13
Payer: COMMERCIAL

## 2021-08-13 ENCOUNTER — APPOINTMENT (OUTPATIENT)
Dept: LAB | Facility: CLINIC | Age: 51
End: 2021-08-13
Payer: COMMERCIAL

## 2021-08-13 VITALS
DIASTOLIC BLOOD PRESSURE: 76 MMHG | TEMPERATURE: 98 F | HEIGHT: 68 IN | HEART RATE: 69 BPM | BODY MASS INDEX: 24.19 KG/M2 | WEIGHT: 159.6 LBS | OXYGEN SATURATION: 97 % | SYSTOLIC BLOOD PRESSURE: 112 MMHG

## 2021-08-13 DIAGNOSIS — E11.9 TYPE 2 DIABETES MELLITUS WITHOUT COMPLICATION, WITHOUT LONG-TERM CURRENT USE OF INSULIN (HCC): ICD-10-CM

## 2021-08-13 DIAGNOSIS — E11.9 TYPE 2 DIABETES MELLITUS WITHOUT COMPLICATION, WITHOUT LONG-TERM CURRENT USE OF INSULIN (HCC): Primary | ICD-10-CM

## 2021-08-13 DIAGNOSIS — E78.00 PURE HYPERCHOLESTEROLEMIA: ICD-10-CM

## 2021-08-13 DIAGNOSIS — I87.2 CHRONIC VENOUS INSUFFICIENCY: ICD-10-CM

## 2021-08-13 LAB
ANION GAP SERPL CALCULATED.3IONS-SCNC: 6 MMOL/L (ref 4–13)
BUN SERPL-MCNC: 19 MG/DL (ref 5–25)
CALCIUM SERPL-MCNC: 8.6 MG/DL (ref 8.3–10.1)
CHLORIDE SERPL-SCNC: 109 MMOL/L (ref 100–108)
CO2 SERPL-SCNC: 25 MMOL/L (ref 21–32)
CREAT SERPL-MCNC: 0.73 MG/DL (ref 0.6–1.3)
CREAT UR-MCNC: 101 MG/DL
EST. AVERAGE GLUCOSE BLD GHB EST-MCNC: 146 MG/DL
GFR SERPL CREATININE-BSD FRML MDRD: 108 ML/MIN/1.73SQ M
GLUCOSE P FAST SERPL-MCNC: 154 MG/DL (ref 65–99)
HBA1C MFR BLD: 6.7 %
MICROALBUMIN UR-MCNC: 7.7 MG/L (ref 0–20)
MICROALBUMIN/CREAT 24H UR: 8 MG/G CREATININE (ref 0–30)
POTASSIUM SERPL-SCNC: 4 MMOL/L (ref 3.5–5.3)
SODIUM SERPL-SCNC: 140 MMOL/L (ref 136–145)

## 2021-08-13 PROCEDURE — 99213 OFFICE O/P EST LOW 20 MIN: CPT | Performed by: FAMILY MEDICINE

## 2021-08-13 PROCEDURE — 83036 HEMOGLOBIN GLYCOSYLATED A1C: CPT | Performed by: FAMILY MEDICINE

## 2021-08-13 PROCEDURE — 82043 UR ALBUMIN QUANTITATIVE: CPT | Performed by: FAMILY MEDICINE

## 2021-08-13 PROCEDURE — 82570 ASSAY OF URINE CREATININE: CPT | Performed by: FAMILY MEDICINE

## 2021-08-13 PROCEDURE — 80048 BASIC METABOLIC PNL TOTAL CA: CPT

## 2021-08-13 PROCEDURE — 36415 COLL VENOUS BLD VENIPUNCTURE: CPT

## 2021-08-13 NOTE — PROGRESS NOTES
Assessment/Plan:    Type 2 diabetes mellitus, without long-term current use of insulin (HCC)    Lab Results   Component Value Date    HGBA1C 6 7 (H) 08/13/2021     Patient has type 2 diabetes mellitus  Diabetes is under good control  I ordered a BMP and hemoglobin A1c  Urine albumin to creatinine ratio was also ordered  I discussed dietary compliance  We discussed annual dilated eye exam   He is up-to-date with this  We discussed routine diabetic foot care  Chronic venous insufficiency    Patient has chronic venous insufficiency  I referred the patient to vascular surgery for evaluation  Patient is interested and surgical intervention  In the meantime, I recommended compression stockings  Pure hypercholesterolemia    Patient will continue atorvastatin 20 mg daily       Diagnoses and all orders for this visit:    Type 2 diabetes mellitus without complication, without long-term current use of insulin (HCC)  -     Hemoglobin A1C  -     Basic metabolic panel; Future  -     Microalbumin / creatinine urine ratio    Chronic venous insufficiency  -     Ambulatory referral to Vascular Surgery; Future    Pure hypercholesterolemia    Other orders  -     Cancel: Microalbumin / creatinine urine ratio          Subjective:      Patient ID: Ashley Alvares is a 49 y o  male  This patient is a 52-year-old [place] male who presents to the office today for his routine checkup  He is accompanied to the office today by his daughter, who acted as   The patient is doing well and has no complaints  His 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 [employer]  His mother's doing most of the cooking  He is doing other things at [facility]  He remains active works long hours  He reports compliance with his medication   Daughter informs me that the patient has been scheduled for a screening colonoscopy      The following portions of the patient's history were reviewed and updated as appropriate: allergies, current medications, past family history, past medical history, past social history, past surgical history and problem list     Review of Systems   Constitutional: Negative for activity change, appetite change and unexpected weight change  Cardiovascular: Negative for chest pain, palpitations and leg swelling  Patient complains of varicose veins   Gastrointestinal: Negative for abdominal distention, abdominal pain, blood in stool, constipation, diarrhea and nausea  Objective:      /76 (BP Location: Left arm, Patient Position: Sitting, Cuff Size: Adult)   Pulse 69   Temp 98 °F (36 7 °C) (Tympanic)   Ht 5' 7 5" (1 715 m)   Wt 72 4 kg (159 lb 9 6 oz)   SpO2 97%   BMI 24 63 kg/m²          Physical Exam  Vitals reviewed  Constitutional:       Comments:  Patient is a 56-year-old  male who appears his stated age and is in no apparent distress   HENT:      Head: Normocephalic and atraumatic  Right Ear: Tympanic membrane, ear canal and external ear normal  There is no impacted cerumen  Left Ear: Tympanic membrane, ear canal and external ear normal  There is no impacted cerumen  Mouth/Throat:      Mouth: Mucous membranes are moist       Pharynx: Oropharynx is clear  No oropharyngeal exudate or posterior oropharyngeal erythema  Eyes:      General: No scleral icterus  Right eye: No discharge  Left eye: No discharge  Conjunctiva/sclera: Conjunctivae normal       Pupils: Pupils are equal, round, and reactive to light  Cardiovascular:      Rate and Rhythm: Normal rate and regular rhythm  Pulses:           Dorsalis pedis pulses are 2+ on the right side and 2+ on the left side  Posterior tibial pulses are 2+ on the right side and 2+ on the left side  Heart sounds: Normal heart sounds  No murmur heard  No friction rub  No gallop  Comments: There were varicosities present in both lower extremities    The varicosities are soft and easily compressible  There was no tenderness  There was no erythema  Pulmonary:      Effort: Pulmonary effort is normal  No respiratory distress  Breath sounds: Normal breath sounds  No stridor  No wheezing, rhonchi or rales  Abdominal:      General: Bowel sounds are normal  There is no distension  Palpations: Abdomen is soft  There is no mass  Tenderness: There is no abdominal tenderness  There is no guarding  Musculoskeletal:      Cervical back: Neck supple  Right lower leg: No edema  Left lower leg: No edema  Feet:      Right foot:      Skin integrity: No ulcer, skin breakdown, erythema, warmth, callus or dry skin  Left foot:      Skin integrity: No ulcer, skin breakdown, erythema, warmth, callus or dry skin  Lymphadenopathy:      Cervical: No cervical adenopathy  Psychiatric:         Mood and Affect: Mood normal          Behavior: Behavior normal          Thought Content: Thought content normal          Judgment: Judgment normal        Patient's shoes and socks removed  Right Foot/Ankle   Right Foot Inspection  Skin Exam: skin normal and skin intact no dry skin, no warmth, no callus, no erythema, no maceration, no abnormal color, no pre-ulcer, no ulcer and no callus                          Toe Exam: no swelling, no tenderness, erythema and  no right toe deformity  Sensory   Vibration: intact  Proprioception: intact   Monofilament testing: intact  Vascular  Capillary refills: < 3 seconds  The right DP pulse is 2+  The right PT pulse is 2+       Left Foot/Ankle  Left Foot Inspection  Skin Exam: skin normal and skin intactno dry skin, no warmth, no erythema, no maceration, normal color, no pre-ulcer, no ulcer and no callus                         Toe Exam: no swelling, no tenderness, no erythema and no left toe deformity                   Sensory   Vibration: intact  Proprioception: intact  Monofilament: intact  Vascular  Capillary refills: < 3 seconds  The left DP pulse is 2+  The left PT pulse is 2+

## 2021-08-13 NOTE — PATIENT INSTRUCTIONS
Varicose Veins, Ambulatory Care   GENERAL INFORMATION:   Varicose veins  are veins that become large, twisted, and swollen  They are common on the back of your calves, knees, and thighs  Common symptoms include the following:   · Blue, purple, or bulging veins in your legs     · Pain, swelling, or muscle cramps in your legs    · Feeling of heaviness in your legs  Seek immediate care for the following symptoms:   · A wound that does not heal or is infected    · An injury that has broken your skin and caused your varicose veins to bleed    · Swollen and hard leg    · Pain in your leg that does not go away or gets worse    · Legs or feet turn blue or black    · Warmth, tenderness, and pain in your leg (may also look swollen and red)  Treatment of varicose veins  aims to decrease symptoms, improve appearance, and prevent further problems  Treatment will depend on which veins are affected and how severe your condition is  Prescription pain medicine may be given  Ask how to take this medicine safely  Procedures may be done to remove your varicose veins  Your healthcare provider may inject a solution or use a laser to close the varicose veins  Surgery to remove long veins may also be done  Ask your healthcare provider for more information about procedures used in treating varicose veins  Manage varicose veins:   · Wear pressure stockings  The stockings are tight and put pressure on your legs  They improve blood flow and help prevent clots  · Elevate  your legs above the level of your heart for 15 to 30 minutes several times a day  This will help blood to flow back to your heart  · Avoid sitting or standing for long periods of time  This can cause the blood to collect in your legs and make your symptoms worse  Walk around for a few minutes every hour to get blood moving in your legs  · Avoid wearing tight clothing and shoes  Avoid wearing high-heeled shoes   Do not wear clothes that are tight around the waist     · Get plenty of exercise  Talk to your healthcare provider about the best exercise plan for you  Exercise can decrease your blood pressure and improve your health  Bend or rotate your ankles several times every hour  This will help blood to flow back to the heart  · Maintain a healthy weight  Your heart works harder when you are overweight and this can make varicose vein worse  Ask how much you should weigh  Ask him to help you create a weight loss plan if you are overweight  · Do not smoke  If you smoke, it is never too late to quit  Ask for information if you need help quitting  Follow up with your healthcare provider as directed:  Write down your questions so you remember to ask them during your visits  CARE AGREEMENT:   You have the right to help plan your care  Learn about your health condition and how it may be treated  Discuss treatment options with your caregivers to decide what care you want to receive  You always have the right to refuse treatment  The above information is an  only  It is not intended as medical advice for individual conditions or treatments  Talk to your doctor, nurse or pharmacist before following any medical regimen to see if it is safe and effective for you  © 2014 [address] is for End User's use only and may not be sold, redistributed or otherwise used for commercial purposes  All illustrations and images included in CareNotes® are the copyrighted property of [employer]  or Troy Srinivasan

## 2021-08-27 ENCOUNTER — VBI (OUTPATIENT)
Dept: ADMINISTRATIVE | Facility: OTHER | Age: 51
End: 2021-08-27

## 2021-09-01 PROBLEM — I87.2 CHRONIC VENOUS INSUFFICIENCY: Status: ACTIVE | Noted: 2021-09-01

## 2021-09-01 NOTE — ASSESSMENT & PLAN NOTE
Patient has chronic venous insufficiency  I referred the patient to vascular surgery for evaluation  Patient is interested and surgical intervention  In the meantime, I recommended compression stockings

## 2021-09-01 NOTE — ASSESSMENT & PLAN NOTE
Lab Results   Component Value Date    HGBA1C 6 7 (H) 08/13/2021     Patient has type 2 diabetes mellitus  Diabetes is under good control  I ordered a BMP and hemoglobin A1c  Urine albumin to creatinine ratio was also ordered  I discussed dietary compliance  We discussed annual dilated eye exam   He is up-to-date with this  We discussed routine diabetic foot care

## 2021-09-21 ENCOUNTER — CONSULT (OUTPATIENT)
Dept: VASCULAR SURGERY | Facility: CLINIC | Age: 51
End: 2021-09-21
Payer: COMMERCIAL

## 2021-09-21 VITALS
HEART RATE: 62 BPM | HEIGHT: 68 IN | WEIGHT: 155.2 LBS | SYSTOLIC BLOOD PRESSURE: 94 MMHG | TEMPERATURE: 98 F | DIASTOLIC BLOOD PRESSURE: 70 MMHG | BODY MASS INDEX: 23.52 KG/M2

## 2021-09-21 DIAGNOSIS — I87.2 CHRONIC VENOUS INSUFFICIENCY: ICD-10-CM

## 2021-09-21 DIAGNOSIS — R60.0 BILATERAL LOWER EXTREMITY EDEMA: Primary | ICD-10-CM

## 2021-09-21 DIAGNOSIS — I83.813 VARICOSE VEINS OF BILATERAL LOWER EXTREMITIES WITH PAIN: ICD-10-CM

## 2021-09-21 PROCEDURE — 99242 OFF/OP CONSLTJ NEW/EST SF 20: CPT | Performed by: SURGERY

## 2021-09-21 NOTE — PROGRESS NOTES
Assessment/Plan:    Pt is a 48 yo M w/ DM, presents to discuss BLE edema and varicose veins with pain    Bilateral lower extremity edema  Varicose veins of bilateral lower extremities with pain  -     Ambulatory referral to Vascular Surgery  -     Compression Stocking  -     Compression Stocking  -mild edema and larger varicose veins w/ mild symptoms  -given relatively minor symptoms despite large varicose veins, would not recommend surgery at this time; will start medical management with daily compression use, leg elevation, continued activity and healthy weight  -f/u PRN or if symptoms worsen    Subjective:      Patient ID: Ashvin Cramer is a 49 y o  male  Pt is new to our office  He was referred to this office by Dr Juhi Mccullough DO for varicose veins  Pt c/o bulging veins that cause his legs to feel tired  He has not been wearing compression stockings  He first noticed his veins about 6 years ago  He says that the veins have gotten worse in the last 1-2 years  Pt is taking Atorvastatin  He is a non-smoker  HPI:    Patient presents to discuss venous disease  Patient complains of some aching in the legs at the end of the day  He has bulging veins but denies any pain  Denies itching, burning, pain  He has never worn compression  He works long days, standing cooking and works 6d/wk  He is active with work but doesn't exercise  He is a healthy weight  Works at [employer] (family owns [employer])    Denies hx of blood clots  Denies family hx of venous disease  Never Smoker      The following portions of the patient's history were reviewed and updated as appropriate: allergies, current medications, past family history, past medical history, past social history, past surgical history and problem list     Review of Systems   Constitutional: Negative  HENT: Negative  Eyes: Negative  Respiratory: Negative  Cardiovascular: Positive for leg swelling  Gastrointestinal: Negative  Endocrine: Negative  Genitourinary: Negative  Musculoskeletal: Negative  Negative for gait problem  Skin: Negative  Negative for color change and wound  Allergic/Immunologic: Negative  Neurological: Negative  Hematological: Negative  Psychiatric/Behavioral: Negative  Objective:      BP 94/70 (BP Location: Left arm, Patient Position: Sitting, Cuff Size: Standard)   Pulse 62   Temp 98 °F (36 7 °C) (Tympanic)   Ht 5' 7 5" (1 715 m)   Wt 70 4 kg (155 lb 3 2 oz)   BMI 23 95 kg/m²          Physical Exam  Vitals and nursing note reviewed  Constitutional:       Appearance: He is well-developed  HENT:      Head: Normocephalic and atraumatic  Eyes:      Conjunctiva/sclera: Conjunctivae normal    Cardiovascular:      Rate and Rhythm: Normal rate and regular rhythm  Pulses:           Dorsalis pedis pulses are 2+ on the right side and 2+ on the left side  Posterior tibial pulses are 2+ on the right side and 2+ on the left side  Heart sounds: Normal heart sounds  No murmur heard  Comments: No carotid bruits B  Pulmonary:      Effort: Pulmonary effort is normal  No respiratory distress  Breath sounds: Normal breath sounds  No wheezing or rales  Abdominal:      General: There is no distension  Palpations: Abdomen is soft  Tenderness: There is no abdominal tenderness  There is no rebound  Musculoskeletal:         General: Normal range of motion  Cervical back: Normal range of motion and neck supple  Right lower le+ Edema present  Left lower le+ Edema present  Skin:     General: Skin is warm and dry  Comments: Large bulging varicose veins, R>L; On the right, along the R medial thigh, medial knee and anterior lower leg and on left, medial and anterior lower leg   Neurological:      Mental Status: He is alert and oriented to person, place, and time     Psychiatric:         Behavior: Behavior normal            I have reviewed and made appropriate changes to the review of systems input by the medical assistant  Vitals:    09/21/21 0937   BP: 94/70   BP Location: Left arm   Patient Position: Sitting   Cuff Size: Standard   Pulse: 62   Temp: 98 °F (36 7 °C)   TempSrc: Tympanic   Weight: 70 4 kg (155 lb 3 2 oz)   Height: 5' 7 5" (1 715 m)       Patient Active Problem List   Diagnosis    Type 2 diabetes mellitus, without long-term current use of insulin (HCC)    Pure hypercholesterolemia    Generalized abdominal pain    Dermatitis    Herpes zoster without complication    Screening for malignant neoplasm of colon    Atypical chest pain    Prostate cancer screening    Seasonal allergic rhinitis due to pollen    Varicose veins of bilateral lower extremities with pain    Bilateral lower extremity edema       History reviewed  No pertinent surgical history  Family History   Problem Relation Age of Onset    No Known Problems Mother     Liver cancer Father        Social History     Socioeconomic History    Marital status: /Civil Union     Spouse name: Not on file    Number of children: Not on file    Years of education: Not on file    Highest education level: Not on file   Occupational History    Not on file   Tobacco Use    Smoking status: Never Smoker    Smokeless tobacco: Never Used   Vaping Use    Vaping Use: Never used   Substance and Sexual Activity    Alcohol use: Yes     Comment: occasional glass of wine    Drug use: Never    Sexual activity: Not on file   Other Topics Concern    Not on file   Social History Narrative    Not on file     Social Determinants of Health     Financial Resource Strain:     Difficulty of Paying Living Expenses:    Food Insecurity:     Worried About Running Out of Food in the Last Year:     [address] in the Last Year:    Transportation Needs:     Lack of Transportation (Medical):      Lack of Transportation (Non-Medical):    Physical Activity:     Days of Exercise per Week:     Minutes of Exercise per Session:    Stress:     Feeling of Stress :    Social Connections:     Frequency of Communication with Friends and Family:     Frequency of Social Gatherings with Friends and Family:     Attends [employer] Services:     Active Member of Clubs or Organizations:     Attends Club or Organization Meetings:     Marital Status:    Intimate Partner Violence:     Fear of Current or Ex-Partner:     Emotionally Abused:     Physically Abused:     Sexually Abused:        No Known Allergies      Current Outpatient Medications:     atorvastatin (LIPITOR) 20 mg tablet, Take 1 tablet (20 mg total) by mouth daily, Disp: 30 tablet, Rfl: 5    metFORMIN (GLUCOPHAGE) 1000 MG tablet, Take 1 tablet (1,000 mg total) by mouth 2 (two) times a day with meals, Disp: 180 tablet, Rfl: 3    hydrOXYzine pamoate (VISTARIL) 25 mg capsule, TAKE 1 CAPSULE BY MOUTH EVERYDAY AT BEDTIME (Patient not taking: Reported on 8/13/2021), Disp: 90 capsule, Rfl: 1    montelukast (SINGULAIR) 10 mg tablet, Take 1 tablet (10 mg total) by mouth daily at bedtime (Patient not taking: Reported on 9/21/2021), Disp: 30 tablet, Rfl: 5    olopatadine (PATANOL) 0 1 % ophthalmic solution, Administer 1 drop to both eyes 2 (two) times a day as needed (itchy eyes) (Patient not taking: Reported on 9/21/2021), Disp: 5 mL, Rfl: 5

## 2021-09-21 NOTE — PATIENT INSTRUCTIONS
1) Venous disease  -because your symptoms are relatively mild right now, there is no need for surgery to treat the veins  -we are going to start medical management including daily compression sock use, leg elevation, exercise, continue healthy weight, skin care  -if symptoms got worse in the future, we could evaluate you for a surgery to help treat the veins; this is not done preventatively, but only when symptoms are severe  -doing the medical management will help keep symptoms from getting worse    -if you would like to buy compression online, try [employer], which is a high quality brand; look for "gradient compression" 15-20mmHg is lightweight and 20-30mmHg is medium weight    Leg Edema   WHAT YOU NEED TO KNOW:   Leg edema is swelling caused by fluid buildup  Your legs may swell if you sit or stand for long periods of time, are pregnant, or are injured  Swelling may also occur if you have heart failure or circulation problems  This means that your heart does not pump blood through your body as it should  DISCHARGE INSTRUCTIONS:   Call your local emergency number (911 in the [address] [place] [address],[address]) for any of the following:   · You cannot walk  · You have chest pain or trouble breathing that is worse when you lie down  · You suddenly feel lightheaded and have trouble breathing  · You have new and sudden chest pain  You may have more pain when you take deep breaths or cough  · You cough up blood  Return to the emergency department if:   · You feel faint or confused  · Your skin turns blue or gray  · Your leg feels warm, tender, and painful  It may be swollen and red  Call your doctor if:   · You have a fever or feel more tired than usual     · The veins in your legs look larger than usual  They may look full or bulging  · Your legs itch or feel heavy  · You have red or white areas or sores on your legs  The skin may also appear dimpled or have indentations  · You are gaining weight      · You have trouble moving your ankles  · The swelling does not go away, or other parts of your body swell  · You have questions or concerns about your condition or care  Self-care:   · Elevate your legs  Raise your legs above the level of your heart as often as you can  This will help decrease swelling and pain  Prop your legs on pillows or blankets to keep them elevated comfortably  · Wear pressure stockings, if directed  These tight stockings put pressure on your legs to promote blood flow and prevent blood clots  Put them on before you get out of bed  Wear the stockings during the day  Do not wear them while you sleep  · Stay active  Do not stand or sit for long periods of time  Ask your healthcare provider about the best exercise plan for you  · Eat healthy foods  Healthy foods include fruits, vegetables, whole-grain breads, low-fat dairy products, beans, lean meats, and fish  Ask if you need to be on a special diet  · Limit sodium (salt)  Salt will make your body hold even more fluid  Your healthcare provider will tell you how many milligrams (mg) of salt you can have each day  Follow up with your doctor as directed:  Write down your questions so you remember to ask them during your visits  © Copyright [employer] 2021 Information is for End User's use only and may not be sold, redistributed or otherwise used for commercial purposes  All illustrations and images included in CareNotes® are the copyrighted property of [employer]  or Madeleine Rodriguez  The above information is an  only  It is not intended as medical advice for individual conditions or treatments  Talk to your doctor, nurse or pharmacist before following any medical regimen to see if it is safe and effective for you  Venous Insufficiency   WHAT YOU NEED TO KNOW:   What is venous insufficiency?   Venous insufficiency is a condition that prevents blood from flowing out of your legs and back to your heart  Veins contain valves that help blood flow in one direction  Venous insufficiency means the valves do not close correctly or fully  Blood flows back and pools in your leg  This can cause problems such as varicose veins  Venous insufficiency may also be called chronic venous insufficiency or venous stasis  What increases my risk for venous insufficiency? · A leg injury or blood clot    · Being a woman    · Pregnancy    · Older age    · A family history of varicose veins    · Smoking cigarettes    · Obesity, or not getting enough exercise    What are the signs and symptoms of venous insufficiency? · Visible veins on your legs that may be small and red or large, thick, and blue    · Swelling in your ankles or calves    · Changes in skin color, such as dark or purple skin    · An ulcer (open sore) on your leg    · Leg pain that is worse when you are menstruating (women) or when you stand, and better when you elevate your legs    · Burning or itching    · Cramps that happen at night    · Thick, hard skin on your legs and ankles    · Feeling of heaviness in your legs    How is venous insufficiency diagnosed? · Venous duplex imaging  is a procedure used to examine the blood flow through veins  A gel will be applied to your legs  Your healthcare provider will slide a small device called a transducer across the veins  The transducer is a microphone that helps your healthcare provider hear blood moving through the vein  · Contrast venography  is a procedure used to show the veins on x-ray pictures  A catheter is guided into the vein  Contrast liquid is injected into the catheter to help the veins show up better in the pictures  Tell the healthcare provider if you have ever had an allergic reaction to contrast liquid  · Plethysmography  is a procedure that may be used to find changes in blood pressure through your veins  You will wear a blood pressure cuff on your leg   Changes in pressure and the amount of blood that can circulate through your leg veins are measured  Pressures are measured while you stand, sit, and lift your leg  How is venous insufficiency treated? · Medicine  may be given to improve blood flow  The medicines may thin your blood or reduce swelling to help blood flow  You may also need medicine to treat a bacterial infection  · Ablation  is a procedure used to close varicose veins  A catheter is guided until it is near the vein  A device will then be guided to the area  The device may produce energy through radiofrequency or a laser  The energy creates heat that will close the blood vessel  · Sclerotherapy  is a procedure used to fade visible veins  Your healthcare provider will inject a liquid into a spider vein or varicose vein  The liquid causes irritation in the vein  The vein swells and sticks together  Your body will then absorb the vein  · Surgery  may be needed if other treatments do not work  Surgery may be used to repair a leg vein valve or to clip or tie off a vein so blood cannot flow through it  You may need to have a veins removed during surgery called stripping  Surgery may be used to bypass (go around) the damaged vein  Blood will flow through a vein transplanted from another part of your body  What can I do to manage my symptoms? · Wear pressure stockings as directed  Pressure stockings help keep blood from pooling in your leg veins  Your healthcare provider can prescribe stockings that are right for you  Do not buy over-the-counter pressure stockings unless your healthcare provider says it is okay  They may not fit correctly or may have elastic that cuts off your circulation  Ask your healthcare provider when to start wearing pressure stockings and how long to wear them each day  · Do not sit or stand for long periods of time  If you have to sit for a long time, flex and extend your legs, feet, and ankles   Do this about 10 times every 30 minutes to help keep blood flowing  If you have to stand for a long time, take breaks and sit with your legs elevated  · Elevate your legs  Elevate your legs above the level of your heart to reduce swelling  Your healthcare provider may recommend that you keep your legs elevated for 30 minutes at a time  You may need to do this 3 to 4 times per day, or more if your healthcare provider recommends  · Do not smoke  Nicotine and other chemicals in cigarettes and cigars can cause blood vessel damage  Ask your healthcare provider for information if you currently smoke and need help to quit  E-cigarettes or smokeless tobacco still contain nicotine  Talk to your healthcare provider before you use these products  · Reach or maintain a healthy weight  Extra weight can make venous insufficiency worse  Ask your healthcare provider how much you should weigh  He can help you create a weight loss plan if you need to lose weight  · Exercise as directed  Walking can help increase blood flow in your calves  Ask your healthcare provider how much exercise you need each day and which exercises are best for you  · Care for your skin  Keep your skin clean  Do not use any soaps or lotions that may dry your skin  For example, do not use products that contain fragrance or alcohol  If you have a skin ulcer, your healthcare provider may recommend a wet-to-dry bandage  To do this, apply a wet bandage to your wound and allow it to dry  This will help remove drainage from your wound each time you change the bandage  Your healthcare provider will tell you how often to change your bandage and which kind of bandage to use  Check your wound for signs of infection, such as swelling or pus  · Go to physical therapy (PT) as directed  A physical therapist can help you increase movement and range of motion in your legs  When should I seek immediate care? · You have a wound that does not heal or is infected      · You have an injury that has broken your skin and caused your varicose veins to bleed  · Your leg is swollen and hard  · You have pain in your leg that does not go away or gets worse  · Your legs or feet are turning blue or black  · Your leg feels warm, tender, and painful  It may look swollen and red  When should I contact my healthcare provider? · You have a fever  · You have varicose veins and they are painful  · You have new or worsening leg pain, swelling, or redness  · You have new or worsening ulcers or other sores on your leg  · You have questions or concerns about your condition or care  CARE AGREEMENT:   You have the right to help plan your care  Learn about your health condition and how it may be treated  Discuss treatment options with your healthcare providers to decide what care you want to receive  You always have the right to refuse treatment  The above information is an  only  It is not intended as medical advice for individual conditions or treatments  Talk to your doctor, nurse or pharmacist before following any medical regimen to see if it is safe and effective for you  © Copyright [employer] 2021 Information is for End User's use only and may not be sold, redistributed or otherwise used for commercial purposes   All illustrations and images included in CareNotes® are the copyrighted property of [employer]  or [address] [place] [address] [employer]

## 2021-10-22 ENCOUNTER — VBI (OUTPATIENT)
Dept: ADMINISTRATIVE | Facility: OTHER | Age: 51
End: 2021-10-22

## 2021-12-13 ENCOUNTER — APPOINTMENT (OUTPATIENT)
Dept: LAB | Facility: CLINIC | Age: 51
End: 2021-12-13
Payer: COMMERCIAL

## 2021-12-13 ENCOUNTER — OFFICE VISIT (OUTPATIENT)
Dept: FAMILY MEDICINE CLINIC | Facility: CLINIC | Age: 51
End: 2021-12-13
Payer: COMMERCIAL

## 2021-12-13 VITALS
TEMPERATURE: 98.2 F | HEIGHT: 68 IN | SYSTOLIC BLOOD PRESSURE: 130 MMHG | WEIGHT: 153.8 LBS | HEART RATE: 71 BPM | OXYGEN SATURATION: 96 % | DIASTOLIC BLOOD PRESSURE: 80 MMHG | BODY MASS INDEX: 23.31 KG/M2

## 2021-12-13 DIAGNOSIS — E11.9 TYPE 2 DIABETES MELLITUS WITHOUT COMPLICATION, WITHOUT LONG-TERM CURRENT USE OF INSULIN (HCC): ICD-10-CM

## 2021-12-13 DIAGNOSIS — E11.9 TYPE 2 DIABETES MELLITUS WITHOUT COMPLICATION, WITHOUT LONG-TERM CURRENT USE OF INSULIN (HCC): Primary | ICD-10-CM

## 2021-12-13 DIAGNOSIS — E78.00 PURE HYPERCHOLESTEROLEMIA: ICD-10-CM

## 2021-12-13 DIAGNOSIS — L29.9 PRURITUS: ICD-10-CM

## 2021-12-13 DIAGNOSIS — R51.9 NONINTRACTABLE EPISODIC HEADACHE, UNSPECIFIED HEADACHE TYPE: ICD-10-CM

## 2021-12-13 LAB
ALBUMIN SERPL BCP-MCNC: 4.2 G/DL (ref 3.5–5)
ALP SERPL-CCNC: 69 U/L (ref 46–116)
ALT SERPL W P-5'-P-CCNC: 20 U/L (ref 12–78)
ANION GAP SERPL CALCULATED.3IONS-SCNC: 2 MMOL/L (ref 4–13)
AST SERPL W P-5'-P-CCNC: 12 U/L (ref 5–45)
BILIRUB SERPL-MCNC: 1.45 MG/DL (ref 0.2–1)
BUN SERPL-MCNC: 20 MG/DL (ref 5–25)
CALCIUM SERPL-MCNC: 9.4 MG/DL (ref 8.3–10.1)
CHLORIDE SERPL-SCNC: 108 MMOL/L (ref 100–108)
CO2 SERPL-SCNC: 28 MMOL/L (ref 21–32)
CREAT SERPL-MCNC: 0.88 MG/DL (ref 0.6–1.3)
GFR SERPL CREATININE-BSD FRML MDRD: 100 ML/MIN/1.73SQ M
GLUCOSE P FAST SERPL-MCNC: 137 MG/DL (ref 65–99)
LDLC SERPL DIRECT ASSAY-MCNC: 124 MG/DL (ref 0–100)
POTASSIUM SERPL-SCNC: 3.9 MMOL/L (ref 3.5–5.3)
PROT SERPL-MCNC: 7.7 G/DL (ref 6.4–8.2)
SODIUM SERPL-SCNC: 138 MMOL/L (ref 136–145)

## 2021-12-13 PROCEDURE — 83036 HEMOGLOBIN GLYCOSYLATED A1C: CPT | Performed by: FAMILY MEDICINE

## 2021-12-13 PROCEDURE — 3008F BODY MASS INDEX DOCD: CPT | Performed by: FAMILY MEDICINE

## 2021-12-13 PROCEDURE — 36415 COLL VENOUS BLD VENIPUNCTURE: CPT | Performed by: FAMILY MEDICINE

## 2021-12-13 PROCEDURE — 99214 OFFICE O/P EST MOD 30 MIN: CPT | Performed by: FAMILY MEDICINE

## 2021-12-13 PROCEDURE — 83721 ASSAY OF BLOOD LIPOPROTEIN: CPT

## 2021-12-13 PROCEDURE — 80053 COMPREHEN METABOLIC PANEL: CPT

## 2021-12-13 PROCEDURE — 3725F SCREEN DEPRESSION PERFORMED: CPT | Performed by: FAMILY MEDICINE

## 2021-12-13 RX ORDER — ATORVASTATIN CALCIUM 20 MG/1
20 TABLET, FILM COATED ORAL DAILY
Qty: 30 TABLET | Refills: 5 | Status: SHIPPED | OUTPATIENT
Start: 2021-12-13 | End: 2021-12-15 | Stop reason: DRUGHIGH

## 2021-12-14 LAB
EST. AVERAGE GLUCOSE BLD GHB EST-MCNC: 137 MG/DL
HBA1C MFR BLD: 6.4 %

## 2021-12-14 PROCEDURE — 3044F HG A1C LEVEL LT 7.0%: CPT | Performed by: FAMILY MEDICINE

## 2021-12-15 DIAGNOSIS — E78.00 PURE HYPERCHOLESTEROLEMIA: Primary | ICD-10-CM

## 2021-12-15 RX ORDER — ATORVASTATIN CALCIUM 40 MG/1
40 TABLET, FILM COATED ORAL DAILY
Qty: 90 TABLET | Refills: 3 | Status: SHIPPED | OUTPATIENT
Start: 2021-12-15

## 2022-03-06 DIAGNOSIS — J30.1 SEASONAL ALLERGIC RHINITIS DUE TO POLLEN: ICD-10-CM

## 2022-03-07 ENCOUNTER — VBI (OUTPATIENT)
Dept: ADMINISTRATIVE | Facility: OTHER | Age: 52
End: 2022-03-07

## 2022-03-07 RX ORDER — MONTELUKAST SODIUM 10 MG/1
TABLET ORAL
Qty: 90 TABLET | Refills: 1 | Status: SHIPPED | OUTPATIENT
Start: 2022-03-07

## 2024-08-20 NOTE — PROGRESS NOTES
[email or web address]        NAME: Shelbi Smith is a 53 y o  male  : 1970    MRN: [id number]  DATE: 2018  TIME: 11:58 AM    Assessment and Plan   Acute bronchitis, unspecified organism [J20 9]  1  Acute bronchitis, unspecified organism  azithromycin (ZITHROMAX) 250 mg tablet    benzonatate (TESSALON PERLES) 100 mg capsule         Patient Instructions       Follow up with PCP in 3-5 days  Proceed to  ER if symptoms worsen  Chief Complaint     Chief Complaint   Patient presents with    Sore Throat     x 1 week    Cough         History of Present Illness       Patient started with a sore throat cough 1 week ago  The cough is productive of yellow colored mucus  Is not associated with any fever chills chest pain shortness of breath dyspnea on exertion  Review of Systems   Review of Systems   Constitutional: Negative for chills and fever  HENT: Positive for sore throat  Negative for congestion, ear pain, postnasal drip, rhinorrhea, trouble swallowing and voice change  Eyes: Negative for pain  Respiratory: Positive for cough  Negative for chest tightness, shortness of breath and wheezing  Cardiovascular: Negative for chest pain and palpitations  Gastrointestinal: Negative for abdominal pain, diarrhea, nausea and vomiting  Musculoskeletal: Negative for myalgias  Skin: Negative for pallor  Neurological: Negative for dizziness, light-headedness and headaches  Hematological: Negative for adenopathy           Current Medications       Current Outpatient Prescriptions:     azithromycin (ZITHROMAX) 250 mg tablet, Take 2 tablets today then 1 tablet daily x 4 days, Disp: 6 tablet, Rfl: 0    benzonatate (TESSALON PERLES) 100 mg capsule, Take 1 capsule (100 mg total) by mouth 3 (three) times a day as needed for cough, Disp: 20 capsule, Rfl: 0    Current Allergies     Allergies as of 2018    (No Known Allergies)            The following portions of the patient's history were Patient seen and examined, ID Consult dictated,     Impression/ Plan:    Syndrome of fever, fatigue with recent hx of R DFI:  - also now with diarrhea in house,   - source can be R surgical site infection along with C diff Colitis,   - Send stool for C diff,   - R foot drainage Cul,   - MRI - Postsurgical changes of amputation through the 5th MT with small bony resorption at the surgical margin,small fluid collection of lateral foot,   - On IV Vanc, will add IV Ceftriaxone too,   - Noted Podiatry input,     2.   Hx of Necrotic R 5th toe wound with gangrenous changes and cellulitis resulting in hospitalization from 8/03 to 8/11.   - in the presence of DM and PVD,   - MRI with OM in this location,  - MRSA negative screen but MSSA +  - S/P I & D of R fifth digit with partial ray amputation, 8/09/24,  - OR cul with MSSA, Bone Cul with MSSE, Path with neg clearing margin,   - Treated with IV Vanc and Zosyn and discharged on PO Doxy,      3.   Leukopenia: ? Sepsis vs iatrogenic,     4.   DM: with end organ damage,   - CAD with hx of CABG in December 2023  - HbA1C currently under control  - Dopplers with monophasic flow in the R below the knee arteries, S/P Angio no intervention planned,      5.  Disposition - inpatient.  A middle aged male with hx of DM and PVD with R fifth Partial ray amputation admitted with sepsis, surgical site does not look infected, MRI with a possible collection, Can't rule out C diff colitis,   - Drainage cul,   - Stool for C diff,   - Continue IV Vanc, add IV Ceftriaxone,   - Podiatry input,   - WBC trend,     Discussed with patient, RN, all questions answered, further recommendations to follow, Thanks,             reviewed and updated as appropriate: allergies, current medications, past family history, past medical history, past social history, past surgical history and problem list      Past Medical History:   Diagnosis Date    Diabetes mellitus ([address] )        History reviewed  No pertinent surgical history  No family history on file  Medications have been verified  Objective   /66   Pulse 74   Temp (!) 97 4 °F (36 3 °C)   Resp 16   SpO2 98%        Physical Exam     Physical Exam   Constitutional: He is oriented to person, place, and time  He appears well-developed and well-nourished  HENT:   Head: Normocephalic and atraumatic  Right Ear: External ear normal    Left Ear: External ear normal    Nose: Nose normal    Mouth/Throat: Oropharynx is clear and moist    Eyes: Conjunctivae are normal    Neck: Neck supple  Cardiovascular: Normal rate, regular rhythm and normal heart sounds  Pulmonary/Chest: Effort normal and breath sounds normal    Musculoskeletal: Normal range of motion  Lymphadenopathy:     He has no cervical adenopathy  Neurological: He is alert and oriented to person, place, and time  Skin: Skin is warm and dry  No rash noted  Psychiatric: He has a normal mood and affect   His behavior is normal